# Patient Record
Sex: MALE | Race: WHITE | NOT HISPANIC OR LATINO | Employment: OTHER | URBAN - METROPOLITAN AREA
[De-identification: names, ages, dates, MRNs, and addresses within clinical notes are randomized per-mention and may not be internally consistent; named-entity substitution may affect disease eponyms.]

---

## 2017-07-13 ENCOUNTER — TELEPHONE (OUTPATIENT)
Dept: PAIN MEDICINE | Facility: CLINIC | Age: 57
End: 2017-07-13

## 2017-07-13 NOTE — TELEPHONE ENCOUNTER
----- Message from Aliza Mooney sent at 7/13/2017  8:49 AM CDT -----  Contact: pt   Has sciatic pain, wants to be seen this week   Has appt on wait list  Call back

## 2019-09-26 ENCOUNTER — OFFICE VISIT (OUTPATIENT)
Dept: PSYCHIATRY | Facility: CLINIC | Age: 59
End: 2019-09-26
Payer: MEDICARE

## 2019-09-26 VITALS
HEIGHT: 72 IN | HEART RATE: 65 BPM | BODY MASS INDEX: 26.67 KG/M2 | DIASTOLIC BLOOD PRESSURE: 62 MMHG | WEIGHT: 196.88 LBS | SYSTOLIC BLOOD PRESSURE: 100 MMHG

## 2019-09-26 DIAGNOSIS — F41.1 GENERALIZED ANXIETY DISORDER: ICD-10-CM

## 2019-09-26 PROCEDURE — 99999 PR PBB SHADOW E&M-EST. PATIENT-LVL III: CPT | Mod: PBBFAC,,, | Performed by: PSYCHOLOGIST

## 2019-09-26 PROCEDURE — 99999 PR PBB SHADOW E&M-EST. PATIENT-LVL III: ICD-10-PCS | Mod: PBBFAC,,, | Performed by: PSYCHOLOGIST

## 2019-09-26 PROCEDURE — 90792 PR PSYCHIATRIC DIAGNOSTIC EVALUATION W/MEDICAL SERVICES: ICD-10-PCS | Mod: ,,, | Performed by: PSYCHOLOGIST

## 2019-09-26 PROCEDURE — 99213 OFFICE O/P EST LOW 20 MIN: CPT | Mod: PBBFAC,PO | Performed by: PSYCHOLOGIST

## 2019-09-26 PROCEDURE — 90792 PSYCH DIAG EVAL W/MED SRVCS: CPT | Mod: ,,, | Performed by: PSYCHOLOGIST

## 2019-09-26 RX ORDER — BRINZOLAMIDE 10 MG/ML
1 SUSPENSION/ DROPS OPHTHALMIC 3 TIMES DAILY
COMMUNITY
End: 2020-12-16

## 2019-09-26 RX ORDER — BIMATOPROST 0.1 MG/ML
1 SOLUTION/ DROPS OPHTHALMIC
Refills: 4 | COMMUNITY
Start: 2019-07-12

## 2019-09-26 RX ORDER — METOPROLOL SUCCINATE 25 MG/1
25 TABLET, EXTENDED RELEASE ORAL DAILY
COMMUNITY

## 2019-09-26 RX ORDER — INSULIN GLARGINE 300 U/ML
45 INJECTION, SOLUTION SUBCUTANEOUS NIGHTLY
Status: ON HOLD | COMMUNITY
Start: 2019-07-09 | End: 2020-11-16 | Stop reason: HOSPADM

## 2019-09-26 RX ORDER — TIMOLOL MALEATE 5 MG/ML
1 SOLUTION/ DROPS OPHTHALMIC 3 TIMES DAILY
Refills: 5 | COMMUNITY
Start: 2019-09-17

## 2019-09-26 RX ORDER — TAMSULOSIN HYDROCHLORIDE 0.4 MG/1
0.4 CAPSULE ORAL DAILY
COMMUNITY

## 2019-09-26 RX ORDER — CHOLECALCIFEROL (VITD3)/VIT K2 25-90 MCG
1 TABLET,DISINTEGRATING ORAL DAILY
COMMUNITY

## 2019-09-26 RX ORDER — VILAZODONE HYDROCHLORIDE 20 MG/1
TABLET ORAL
COMMUNITY
Start: 2019-09-03 | End: 2019-10-01

## 2019-09-26 RX ORDER — PREDNISOLONE ACETATE 10 MG/ML
1 SUSPENSION/ DROPS OPHTHALMIC 2 TIMES DAILY
Refills: 1 | COMMUNITY
Start: 2019-08-23

## 2019-09-26 RX ORDER — NITROGLYCERIN 0.4 MG/1
0.4 TABLET SUBLINGUAL EVERY 5 MIN PRN
Refills: 5 | COMMUNITY
Start: 2019-07-10

## 2019-09-26 RX ORDER — BRIMONIDINE TARTRATE AND TIMOLOL MALEATE 2; 5 MG/ML; MG/ML
1 SOLUTION OPHTHALMIC
COMMUNITY

## 2019-09-26 RX ORDER — EPINEPHRINE 0.22MG
100 AEROSOL WITH ADAPTER (ML) INHALATION NIGHTLY
COMMUNITY

## 2019-09-26 RX ORDER — TESTOSTERONE CYPIONATE 200 MG/ML
200 INJECTION, SOLUTION INTRAMUSCULAR
Refills: 0 | COMMUNITY
Start: 2019-09-10 | End: 2023-02-20

## 2019-09-26 RX ORDER — CLONAZEPAM 1 MG/1
0.5 TABLET ORAL NIGHTLY
COMMUNITY
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
## 2019-09-26 NOTE — LETTER
October 1, 2019        Ernie Brannon MD  [address]  [address]  [place] [address]             [facility] - Psychiatry  [address]  [facility] [address]  Phone: [phone]   Patient: Albert David   MR Number: [id number]   YOB: 1960   Date of Visit: 9/26/2019       Dear Dr. Brannon:    Thank you for referring Albert David to me for evaluation. Below are the relevant portions of my assessment and plan of care.    Genetic testing ordered and based on this, low dose Effexor will be started. Will adjust anti-anxiety treatment as needed. Klonopin PRN continued.      If you have questions, please do not hesitate to call me. I look forward to following Albert along with you.    Sincerely,      Jaret Wise, PhD, MP           CC  No Recipients

## 2019-10-01 ENCOUNTER — TELEPHONE (OUTPATIENT)
Dept: PSYCHIATRY | Facility: CLINIC | Age: 59
End: 2019-10-01

## 2019-10-01 RX ORDER — VENLAFAXINE 37.5 MG/1
37.5 TABLET ORAL DAILY
Qty: 30 TABLET | Refills: 0 | Status: SHIPPED | OUTPATIENT
Start: 2019-10-01 | End: 2019-11-01

## 2019-10-01 NOTE — TELEPHONE ENCOUNTER
Per Dr. Wise received and reviewed patient's  genetic testing,  please let pt know that he called in Effexor 37.5mg Take 1 pill once a day, and Follow up in 4 weeks.     I spoke with pt, advise of Dr. Wise's recommendations and instructions, patient  read back and verbalized  understanding. Patient aware to call us back if he has any other questions or concerns. Also 4 week F/U appt was made.

## 2019-11-01 ENCOUNTER — OFFICE VISIT (OUTPATIENT)
Dept: PSYCHIATRY | Facility: CLINIC | Age: 59
End: 2019-11-01
Payer: MEDICARE

## 2019-11-01 VITALS
HEART RATE: 72 BPM | HEIGHT: 72 IN | WEIGHT: 194 LBS | SYSTOLIC BLOOD PRESSURE: 116 MMHG | BODY MASS INDEX: 26.28 KG/M2 | DIASTOLIC BLOOD PRESSURE: 68 MMHG

## 2019-11-01 DIAGNOSIS — F41.1 GENERALIZED ANXIETY DISORDER: Primary | ICD-10-CM

## 2019-11-01 PROCEDURE — 99213 OFFICE O/P EST LOW 20 MIN: CPT | Mod: S$PBB,,, | Performed by: PSYCHOLOGIST

## 2019-11-01 PROCEDURE — 90833 PSYTX W PT W E/M 30 MIN: CPT | Mod: ,,, | Performed by: PSYCHOLOGIST

## 2019-11-01 PROCEDURE — 99999 PR PBB SHADOW E&M-EST. PATIENT-LVL III: CPT | Mod: PBBFAC,,, | Performed by: PSYCHOLOGIST

## 2019-11-01 PROCEDURE — 99213 OFFICE O/P EST LOW 20 MIN: CPT | Mod: PBBFAC,PO | Performed by: PSYCHOLOGIST

## 2019-11-01 PROCEDURE — 99999 PR PBB SHADOW E&M-EST. PATIENT-LVL III: ICD-10-PCS | Mod: PBBFAC,,, | Performed by: PSYCHOLOGIST

## 2019-11-01 PROCEDURE — 99213 PR OFFICE/OUTPT VISIT, EST, LEVL III, 20-29 MIN: ICD-10-PCS | Mod: S$PBB,,, | Performed by: PSYCHOLOGIST

## 2019-11-01 PROCEDURE — 90833 PR PSYCHOTHERAPY W/PATIENT W/E&M, 30 MIN (ADD ON): ICD-10-PCS | Mod: ,,, | Performed by: PSYCHOLOGIST

## 2019-11-01 RX ORDER — BUSPIRONE HYDROCHLORIDE 10 MG/1
10 TABLET ORAL 3 TIMES DAILY
Qty: 90 TABLET | Refills: 1 | Status: SHIPPED | OUTPATIENT
Start: 2019-11-01 | End: 2019-12-04

## 2019-11-01 RX ORDER — SILDENAFIL 100 MG/1
100 TABLET, FILM COATED ORAL
Refills: 6 | COMMUNITY
Start: 2019-10-16

## 2019-11-01 NOTE — PROGRESS NOTES
Outpatient Psychiatry Follow-Up Visit    Clinical Status of Patient: Outpatient (Ambulatory)  11/01/2019     Chief Complaint: 59 year old male presenting today for a follow-up.       Interval History and Content of Current Session:  Interim Events/Subjective Report/Content of Current Session:  follow-up appointment.    Pt is a 59 year old male with past psychiatric hx of generalized anxiety who presents for follow-up treatment. Pt reported discontinue Effexor XR 37.5mg Q D a few days ago due to no efficacy and intolerable sexual dysfunction. We reviewed his genetic testing and prior medication hx. Buspar will be tried and if unsuccessful, gabapentin will be tried. Pt indicated that he was taking Klonopin 0.5mg BID lately and he was discouraged from daily use and educated about tolerance/addictive properties and he agreed.     Past Psychiatric hx: Vybriid, Effexor, Prozac, Elavil, Zoloft, Celexa, Lexapro, Trintellix, Cymbalta, Concerta, Strattera, Wellbutrin    Past Medical hx: History reviewed. No pertinent past medical history.     Interim hx:  Medication changes last visit:   Started Effexor XR 37.5mg Q D  Anxiety: moderate - unchanged  Depression: pt denied     Denies suicidal/homicidal ideations.  Denies hopelessness/worthlessness.    Denies auditory/visual hallucinations      Alcohol: minimal use  Drug: pt denied  Caffeine: minimal use  Tobacco: pt denied      Review of Systems   · PSYCHIATRIC: Pertinent items are noted in the narrative.        CONSTITUTIONAL: weight stable     Past Medical, Family and Social History: The patient's past medical, family and social history have been reviewed and updated as appropriate within the electronic medical record. See encounter notes.     Current Psychiatric Medication:  Klonopin 1mg Q HS PRN     Compliance: yes      Side effects: none     Risk Parameters:  Patient reports no suicidal ideation  Patient reports no homicidal ideation  Patient reports no self-injurious  behavior  Patient reports no violent behavior     Exam (detailed: at least 9 elements; comprehensive: all 15 elements)   Constitutional  Vitals:  Most recent vital signs, dated less than 90 days prior to this appointment, were reviewed. Pulse:  [72]   BP: (116)/(68)       General:  unremarkable, age appropriate, casual attire, good eye contact, good rapport       Musculoskeletal  Muscle Strength/Tone:  no flaccidity, no tremor    Gait & Station:  normal      Psychiatric                       Speech:  normal tone, normal rate, rhythm, and volume   Mood & Affect:   Depressed, anxious         Thought Process:   Goal directed; Linear    Associations:   intact   Thought Content:   No SI/HI, delusions, or paranoia, no AV/VH   Insight & Judgement:   Good, adequate to circumstances   Orientation:   grossly intact; alert and oriented x 4    Memory:  intact for content of interview    Language:  grossly intact, can repeat    Attention Span  : Grossly intact for content of interview   Fund of Knowledge:   intact and appropriate to age and level of education        Assessment and Diagnosis   Status/Progress: unchanged     Impression: Pt appears to struggle with generalized anxiety that causes high subjective distress but conventional treatments cause lethargy and low motivation. Pt is requesting to try another medication trial.    Diagnosis: Generalized Anxiety Disorder    Intervention/Counseling/Treatment Plan   · Medication Management:      1. Discontinue Effexor and start Buspar 10mg TID. Pt will reach out if side effects are intolerable and Gabapentin will be tried.     2. Use Klonopin 0.5-1mg Q D PRN sparingly and not daily     3. Call to report any worsening of symptoms or problems with the medication. Pt instructed to go to ER with thoughts of harming self, others    Psychotherapy:   · Target symptoms: anxiety  · Why chosen therapy is appropriate versus another modality: CBT used; relevant to diagnosis, patient responds  to this modality  · Outcome monitoring methods: self-report, observation  · Therapeutic intervention type: Cognitive Behavioral Therapy  · Topics discussed/themes: building skills sets for symptom management, symptom recognition, nutrition, exercise  · The patient's response to the intervention is good  · Patient's response to treatment is: good.   · The patient's progress toward treatment goals: unchanged  · Duration of intervention: 20 minutes     Return to clinic: one month    -Spent 20min face to face with the pt; >50% time spent in counseling   -Cognitive-Behavioral/Supportive therapy and psychoeducation provided  -R/B/SE's of medications discussed with the pt who expresses understanding and chooses to take medications as prescribed.   -Pt instructed to call clinic, 911 or go to nearest emergency room if sxs worsen or pt is in   crisis. The pt expresses understanding.    Jaret Wise, PhD, MP      Antidepressant/Antianxiety Medication Initiation:  Patient informed of risks, benefits, and potential side effects of medication and accepts informed consent.  Common side effects include nausea, fatigue, headache, insomnia., Specifically discussed the possibility of new or worsening suicidal thoughts/depression.  Patient instructed to stop the medication immediately and seek urgent treatment if this occurs., Patient instructed not to abruptly discontinue medication without physician guidance except in cases of sudden onset or worsening of SI.

## 2019-12-04 ENCOUNTER — OFFICE VISIT (OUTPATIENT)
Dept: PSYCHIATRY | Facility: CLINIC | Age: 59
End: 2019-12-04
Payer: MEDICARE

## 2019-12-04 VITALS
SYSTOLIC BLOOD PRESSURE: 96 MMHG | HEIGHT: 72 IN | WEIGHT: 195.19 LBS | DIASTOLIC BLOOD PRESSURE: 60 MMHG | BODY MASS INDEX: 26.44 KG/M2 | HEART RATE: 62 BPM

## 2019-12-04 DIAGNOSIS — F41.1 GENERALIZED ANXIETY DISORDER: Primary | ICD-10-CM

## 2019-12-04 PROCEDURE — 90833 PR PSYCHOTHERAPY W/PATIENT W/E&M, 30 MIN (ADD ON): ICD-10-PCS | Mod: S$PBB,,, | Performed by: PSYCHOLOGIST

## 2019-12-04 PROCEDURE — 90833 PSYTX W PT W E/M 30 MIN: CPT | Mod: S$PBB,,, | Performed by: PSYCHOLOGIST

## 2019-12-04 PROCEDURE — 99999 PR PBB SHADOW E&M-EST. PATIENT-LVL III: CPT | Mod: PBBFAC,,, | Performed by: PSYCHOLOGIST

## 2019-12-04 PROCEDURE — 99999 PR PBB SHADOW E&M-EST. PATIENT-LVL III: ICD-10-PCS | Mod: PBBFAC,,, | Performed by: PSYCHOLOGIST

## 2019-12-04 PROCEDURE — 99213 PR OFFICE/OUTPT VISIT, EST, LEVL III, 20-29 MIN: ICD-10-PCS | Mod: S$PBB,,, | Performed by: PSYCHOLOGIST

## 2019-12-04 PROCEDURE — 99213 OFFICE O/P EST LOW 20 MIN: CPT | Mod: PBBFAC,PO | Performed by: PSYCHOLOGIST

## 2019-12-04 PROCEDURE — 99213 OFFICE O/P EST LOW 20 MIN: CPT | Mod: S$PBB,,, | Performed by: PSYCHOLOGIST

## 2019-12-04 RX ORDER — BUSPIRONE HYDROCHLORIDE 10 MG/1
20 TABLET ORAL 3 TIMES DAILY
Qty: 180 TABLET | Refills: 2 | Status: SHIPPED | OUTPATIENT
Start: 2019-12-04 | End: 2020-02-03

## 2019-12-04 RX ORDER — LISINOPRIL 20 MG/1
20 TABLET ORAL
COMMUNITY
Start: 2019-11-17

## 2019-12-04 NOTE — PROGRESS NOTES
Outpatient Psychiatry Follow-Up Visit    Clinical Status of Patient: Outpatient (Ambulatory)  12/04/2019     Chief Complaint: 59 year old male presenting today for a follow-up.       Interval History and Content of Current Session:  Interim Events/Subjective Report/Content of Current Session:  follow-up appointment.    Pt is a 59 year old male with past psychiatric hx of generalized anxiety who presents for follow-up treatment. Pt reported partial improvement on Buspar 10mg TID with good compliance and no side effects. Pt's wife noted that she was somewhat less irritable. Pt has continued to take Klonopin 0.5mg Q HS for sleep. Pt denied any other significant changes.     Past Psychiatric hx: Vybriid, Effexor, Prozac, Elavil, Zoloft, Celexa, Lexapro, Trintellix, Cymbalta, Concerta, Strattera, Wellbutrin    Past Medical hx: History reviewed. No pertinent past medical history.     Interim hx:  Medication changes last visit:   Started Buspar 10mg TID  Anxiety: mild - improved  Depression: pt denied     Denies suicidal/homicidal ideations.  Denies hopelessness/worthlessness.    Denies auditory/visual hallucinations      Alcohol: minimal use  Drug: pt denied  Caffeine: minimal use  Tobacco: pt denied      Review of Systems   · PSYCHIATRIC: Pertinent items are noted in the narrative.        CONSTITUTIONAL: weight stable     Past Medical, Family and Social History: The patient's past medical, family and social history have been reviewed and updated as appropriate within the electronic medical record. See encounter notes.     Current Psychiatric Medication:  Buspar 10mg TID and Klonopin 0.5mg Q HS PRN     Compliance: yes      Side effects: none     Risk Parameters:  Patient reports no suicidal ideation  Patient reports no homicidal ideation  Patient reports no self-injurious behavior  Patient reports no violent behavior     Exam (detailed: at least 9 elements; comprehensive: all 15 elements)   Constitutional  Vitals:  Most  recent vital signs, dated less than 90 days prior to this appointment, were reviewed. Pulse:  [62]   BP: (96)/(60)       General:  unremarkable, age appropriate, casual attire, good eye contact, good rapport       Musculoskeletal  Muscle Strength/Tone:  no flaccidity, no tremor    Gait & Station:  normal      Psychiatric                       Speech:  normal tone, normal rate, rhythm, and volume   Mood & Affect:   Depressed, anxious         Thought Process:   Goal directed; Linear    Associations:   intact   Thought Content:   No SI/HI, delusions, or paranoia, no AV/VH   Insight & Judgement:   Good, adequate to circumstances   Orientation:   grossly intact; alert and oriented x 4    Memory:  intact for content of interview    Language:  grossly intact, can repeat    Attention Span  : Grossly intact for content of interview   Fund of Knowledge:   intact and appropriate to age and level of education        Assessment and Diagnosis   Status/Progress: unchanged     Impression: Pt appears to struggle with generalized anxiety that causes high subjective distress but conventional treatments cause lethargy and low motivation. Pt is requesting to try another medication trial.    Diagnosis: Generalized Anxiety Disorder    Intervention/Counseling/Treatment Plan   · Medication Management:      1. Increase Buspar to 20mg TID     2. Use Klonopin 0.5-1mg Q D PRN sparingly     3. Call to report any worsening of symptoms or problems with the medication. Pt instructed to go to ER with thoughts of harming self, others    Psychotherapy:   · Target symptoms: anxiety  · Why chosen therapy is appropriate versus another modality: CBT used; relevant to diagnosis, patient responds to this modality  · Outcome monitoring methods: self-report, observation  · Therapeutic intervention type: Cognitive Behavioral Therapy  · Topics discussed/themes: building skills sets for symptom management, symptom recognition, nutrition, exercise  · The  patient's response to the intervention is good  · Patient's response to treatment is: good.   · The patient's progress toward treatment goals: unchanged  · Duration of intervention: 20 minutes     Return to clinic: 2 months    -Spent 20min face to face with the pt; >50% time spent in counseling   -Cognitive-Behavioral/Supportive therapy and psychoeducation provided  -R/B/SE's of medications discussed with the pt who expresses understanding and chooses to take medications as prescribed.   -Pt instructed to call clinic, 911 or go to nearest emergency room if sxs worsen or pt is in   crisis. The pt expresses understanding.    Jaret Wise, PhD, MP      Antidepressant/Antianxiety Medication Initiation:  Patient informed of risks, benefits, and potential side effects of medication and accepts informed consent.  Common side effects include nausea, fatigue, headache, insomnia., Specifically discussed the possibility of new or worsening suicidal thoughts/depression.  Patient instructed to stop the medication immediately and seek urgent treatment if this occurs., Patient instructed not to abruptly discontinue medication without physician guidance except in cases of sudden onset or worsening of SI.

## 2020-02-03 ENCOUNTER — OFFICE VISIT (OUTPATIENT)
Dept: PSYCHIATRY | Facility: CLINIC | Age: 60
End: 2020-02-03
Payer: MEDICARE

## 2020-02-03 VITALS
WEIGHT: 194 LBS | HEIGHT: 72 IN | BODY MASS INDEX: 26.28 KG/M2 | DIASTOLIC BLOOD PRESSURE: 72 MMHG | SYSTOLIC BLOOD PRESSURE: 128 MMHG

## 2020-02-03 DIAGNOSIS — F41.1 GENERALIZED ANXIETY DISORDER: Primary | ICD-10-CM

## 2020-02-03 PROCEDURE — 99212 OFFICE O/P EST SF 10 MIN: CPT | Mod: PBBFAC,PO | Performed by: PSYCHOLOGIST

## 2020-02-03 PROCEDURE — 90833 PSYTX W PT W E/M 30 MIN: CPT | Mod: S$PBB,,, | Performed by: PSYCHOLOGIST

## 2020-02-03 PROCEDURE — 90833 PR PSYCHOTHERAPY W/PATIENT W/E&M, 30 MIN (ADD ON): ICD-10-PCS | Mod: S$PBB,,, | Performed by: PSYCHOLOGIST

## 2020-02-03 PROCEDURE — 99999 PR PBB SHADOW E&M-EST. PATIENT-LVL II: ICD-10-PCS | Mod: PBBFAC,,, | Performed by: PSYCHOLOGIST

## 2020-02-03 PROCEDURE — 99999 PR PBB SHADOW E&M-EST. PATIENT-LVL II: CPT | Mod: PBBFAC,,, | Performed by: PSYCHOLOGIST

## 2020-02-03 PROCEDURE — 99213 OFFICE O/P EST LOW 20 MIN: CPT | Mod: S$PBB,,, | Performed by: PSYCHOLOGIST

## 2020-02-03 PROCEDURE — 99213 PR OFFICE/OUTPT VISIT, EST, LEVL III, 20-29 MIN: ICD-10-PCS | Mod: S$PBB,,, | Performed by: PSYCHOLOGIST

## 2020-02-03 RX ORDER — BLOOD SUGAR DIAGNOSTIC
STRIP MISCELLANEOUS
COMMUNITY
Start: 2019-12-14 | End: 2023-02-20

## 2020-02-03 RX ORDER — OMEPRAZOLE 40 MG/1
CAPSULE, DELAYED RELEASE ORAL
COMMUNITY
Start: 2019-12-16 | End: 2020-11-03

## 2020-02-03 RX ORDER — BUSPIRONE HYDROCHLORIDE 10 MG/1
20 TABLET ORAL 3 TIMES DAILY
Qty: 540 TABLET | Refills: 1 | Status: SHIPPED | OUTPATIENT
Start: 2020-02-03 | End: 2020-08-03 | Stop reason: SDUPTHER

## 2020-02-03 NOTE — LETTER
February 3, 2020        Ernie Brannon MD  [address]  [address]  [place] [address]             [facility] - Psychiatry  [address]  [facility] [address]  Phone: [phone]   Patient: Albert David   MR Number: [id number]   YOB: 1960   Date of Visit: 2/3/2020       Dear Dr. Brannon:    Thank you for referring Albert David to me for evaluation. Below are the relevant portions of my assessment and plan of care.    Pt appears stable on Buspar 20mg TID and Klonopin 0.5mg Q HS. Will continue to monitor and follow.     If you have questions, please do not hesitate to call me. I look forward to following Albert along with you.    Sincerely,      Jaret Wise, PhD, MP           CC  No Recipients

## 2020-02-03 NOTE — PROGRESS NOTES
Outpatient Psychiatry Follow-Up Visit    Clinical Status of Patient: Outpatient (Ambulatory)  02/03/2020     Chief Complaint: 59 year old male presenting today for a follow-up.       Interval History and Content of Current Session:  Interim Events/Subjective Report/Content of Current Session:  follow-up appointment.    Pt is a 59 year old male with past psychiatric hx of generalized anxiety who presents for follow-up treatment. Pt reported slightly improved but still only partial efficacy with Buspar 20mg TID with his anxiety. Pt said that he is still anxious and still picks his arms and eats in the evening when anxious but overall noted improvement. Pt has continued to take Klonopin 0.5mg Q HS for sleep. Pt denied any other significant changes.     Past Psychiatric hx: Vybriid, Effexor, Prozac, Elavil, Zoloft, Celexa, Lexapro, Trintellix, Cymbalta, Concerta, Strattera, Wellbutrin    Past Medical hx: History reviewed. No pertinent past medical history.     Interim hx:  Medication changes last visit:   Increased Buspar dosage to 20mg TID  Anxiety: mild - improved  Depression: pt denied     Denies suicidal/homicidal ideations.  Denies hopelessness/worthlessness.    Denies auditory/visual hallucinations      Alcohol: minimal use  Drug: pt denied  Caffeine: minimal use  Tobacco: pt denied      Review of Systems   · PSYCHIATRIC: Pertinent items are noted in the narrative.        CONSTITUTIONAL: weight stable     Past Medical, Family and Social History: The patient's past medical, family and social history have been reviewed and updated as appropriate within the electronic medical record. See encounter notes.     Current Psychiatric Medication:  Buspar 20mg TID and Klonopin 0.5mg Q HS PRN     Compliance: yes      Side effects: none     Risk Parameters:  Patient reports no suicidal ideation  Patient reports no homicidal ideation  Patient reports no self-injurious behavior  Patient reports no violent behavior     Exam  (detailed: at least 9 elements; comprehensive: all 15 elements)   Constitutional  Vitals:  Most recent vital signs, dated less than 90 days prior to this appointment, were reviewed. BP: (128)/(72)       General:  unremarkable, age appropriate, casual attire, good eye contact, good rapport       Musculoskeletal  Muscle Strength/Tone:  no flaccidity, no tremor    Gait & Station:  normal      Psychiatric                       Speech:  normal tone, normal rate, rhythm, and volume   Mood & Affect:   Depressed, anxious         Thought Process:   Goal directed; Linear    Associations:   intact   Thought Content:   No SI/HI, delusions, or paranoia, no AV/VH   Insight & Judgement:   Good, adequate to circumstances   Orientation:   grossly intact; alert and oriented x 4    Memory:  intact for content of interview    Language:  grossly intact, can repeat    Attention Span  : Grossly intact for content of interview   Fund of Knowledge:   intact and appropriate to age and level of education        Assessment and Diagnosis   Status/Progress: unchanged     Impression: Pt appears to struggle with generalized anxiety that causes high subjective distress but conventional treatments cause lethargy and low motivation. Pt is requesting to try another medication trial.    Diagnosis: Generalized Anxiety Disorder    Intervention/Counseling/Treatment Plan   · Medication Management:      1. Continue Buspar to 20mg TID     2. Use Klonopin 0.5-1mg Q D PRN sparingly     3. Call to report any worsening of symptoms or problems with the medication. Pt instructed to go to ER with thoughts of harming self, others    Psychotherapy:   · Target symptoms: anxiety  · Why chosen therapy is appropriate versus another modality: CBT used; relevant to diagnosis, patient responds to this modality  · Outcome monitoring methods: self-report, observation  · Therapeutic intervention type: Cognitive Behavioral Therapy  · Topics discussed/themes: building skills  sets for symptom management, symptom recognition, nutrition, exercise  · The patient's response to the intervention is good  · Patient's response to treatment is: good.   · The patient's progress toward treatment goals: unchanged  · Duration of intervention: 20 minutes     Return to clinic: 6 months or earlier PRN    -Spent 20min face to face with the pt; >50% time spent in counseling   -Cognitive-Behavioral/Supportive therapy and psychoeducation provided  -R/B/SE's of medications discussed with the pt who expresses understanding and chooses to take medications as prescribed.   -Pt instructed to call clinic, 911 or go to nearest emergency room if sxs worsen or pt is in   crisis. The pt expresses understanding.    Jaret Wise, PhD, MP

## 2020-06-16 ENCOUNTER — TELEPHONE (OUTPATIENT)
Dept: DERMATOLOGY | Facility: CLINIC | Age: 60
End: 2020-06-16

## 2020-06-16 ENCOUNTER — OFFICE VISIT (OUTPATIENT)
Dept: DERMATOLOGY | Facility: CLINIC | Age: 60
End: 2020-06-16
Payer: MEDICARE

## 2020-06-16 VITALS — HEIGHT: 72 IN | BODY MASS INDEX: 26.28 KG/M2 | RESPIRATION RATE: 18 BRPM | WEIGHT: 194 LBS

## 2020-06-16 DIAGNOSIS — Z12.83 SCREENING EXAM FOR SKIN CANCER: Primary | ICD-10-CM

## 2020-06-16 DIAGNOSIS — L28.1 PRURIGO NODULARIS: ICD-10-CM

## 2020-06-16 DIAGNOSIS — L82.1 SEBORRHEIC KERATOSES: ICD-10-CM

## 2020-06-16 DIAGNOSIS — Z87.2 HISTORY OF ACTINIC KERATOSES: ICD-10-CM

## 2020-06-16 DIAGNOSIS — I99.9 VASCULAR LESION: ICD-10-CM

## 2020-06-16 PROCEDURE — 99203 OFFICE O/P NEW LOW 30 MIN: CPT | Mod: S$PBB,,, | Performed by: DERMATOLOGY

## 2020-06-16 PROCEDURE — 99999 PR PBB SHADOW E&M-EST. PATIENT-LVL IV: ICD-10-PCS | Mod: PBBFAC,,, | Performed by: DERMATOLOGY

## 2020-06-16 PROCEDURE — 99214 OFFICE O/P EST MOD 30 MIN: CPT | Mod: PBBFAC,PO | Performed by: DERMATOLOGY

## 2020-06-16 PROCEDURE — 99999 PR PBB SHADOW E&M-EST. PATIENT-LVL IV: CPT | Mod: PBBFAC,,, | Performed by: DERMATOLOGY

## 2020-06-16 PROCEDURE — 99203 PR OFFICE/OUTPT VISIT, NEW, LEVL III, 30-44 MIN: ICD-10-PCS | Mod: S$PBB,,, | Performed by: DERMATOLOGY

## 2020-06-16 RX ORDER — FLUOCINONIDE 0.5 MG/G
OINTMENT TOPICAL
Qty: 60 G | Refills: 1 | Status: SHIPPED | OUTPATIENT
Start: 2020-06-16

## 2020-06-16 RX ORDER — TRIAMCINOLONE ACETONIDE 1 MG/G
CREAM TOPICAL 2 TIMES DAILY
Qty: 453.6 G | Refills: 1 | Status: CANCELLED | OUTPATIENT
Start: 2020-06-16

## 2020-06-16 NOTE — TELEPHONE ENCOUNTER
----- Message from Samantha De Oliveira sent at 6/16/2020  1:09 PM CDT -----  Regarding: f/u 6 month appt  [phone] / pt was seen today,, asking for nurse to schedule his  body check,, thought he needs one every 6 months.. asking if you can call with that appointment. He is outside a lot... ty

## 2020-06-16 NOTE — PROGRESS NOTES
Subjective:       Patient ID:  Albert David is a 60 y.o. male who presents for   Chief Complaint   Patient presents with    Skin Check     Patient present for initial visit, skin check.     The patient denies any lesions at present that are changing in color, increasing in size, painful, itching, or bleeding.     no Phx of NMSC.  no Fhx of melanoma.    History reviewed. No pertinent past medical history.  history AKs  Red area nose, years, tx light therapy. No resolution.   Picks at lesions on arms, years, admits to chronically picking. History anxiety on buspar and clonazopam     Review of Systems   Skin: Positive for activity-related sunscreen use. Negative for daily sunscreen use and recent sunburn.   Hematologic/Lymphatic: Does not bruise/bleed easily.        Objective:    Physical Exam   Constitutional: He appears well-developed and well-nourished. No distress.   HENT:   Mouth/Throat: Lips normal.    Eyes: Lids are normal.  No conjunctival no injection.   Cardiovascular: There is no local extremity swelling and no dependent edema.     Neurological: He is alert and oriented to person, place, and time. He is not disoriented.   Psychiatric: He has a normal mood and affect.   Skin:   Areas Examined (abnormalities noted in diagram):   Scalp / Hair Palpated and Inspected  Head / Face Inspection Performed  Neck Inspection Performed  Chest / Axilla Inspection Performed  Abdomen Inspection Performed  Back Inspection Performed  RUE Inspected  LUE Inspection Performed  RLE Inspected  LLE Inspection Performed  Nails and Digits Inspection Performed                   Diagram Legend     Erythematous scaling macule/papule c/w actinic keratosis       Vascular papule c/w angioma      Pigmented verrucoid papule/plaque c/w seborrheic keratosis      Yellow umbilicated papule c/w sebaceous hyperplasia      Irregularly shaped tan macule c/w lentigo     1-2 mm smooth white papules consistent with Milia      Movable  subcutaneous cyst with punctum c/w epidermal inclusion cyst      Subcutaneous movable cyst c/w pilar cyst      Firm pink to brown papule c/w dermatofibroma      Pedunculated fleshy papule(s) c/w skin tag(s)      Evenly pigmented macule c/w junctional nevus     Mildly variegated pigmented, slightly irregular-bordered macule c/w mildly atypical nevus      Flesh colored to evenly pigmented papule c/w intradermal nevus       Pink pearly papule/plaque c/w basal cell carcinoma      Erythematous hyperkeratotic cursted plaque c/w SCC      Surgical scar with no sign of skin cancer recurrence      Open and closed comedones      Inflammatory papules and pustules      Verrucoid papule consistent consistent with wart     Erythematous eczematous patches and plaques     Dystrophic onycholytic nail with subungual debris c/w onychomycosis     Umbilicated papule    Erythematous-base heme-crusted tan verrucoid plaque consistent with inflamed seborrheic keratosis     Erythematous Silvery Scaling Plaque c/w Psoriasis     See annotation      Assessment / Plan:        Screening exam for skin cancer  Upper body skin examination performed today including at least 6 points as noted in physical examination. No lesions suspicious for malignancy noted.        Prurigo nodularis  Discussed with patient the importance of not manipulating skin lesions. Trauma often exacerbates condition. Trimming nails is recommended to avoid puncturing skin.     Consider ILK in future   -     fluocinonide (LIDEX) 0.05 % ointment; aaa arms bid x 1-2 weeks, avoid chronic use  Dispense: 60 g; Refill: 1    Vascular lesion, nose  Failed what sounds like laser therapy in past  Discussed no topical options  Consider repeat laser in future     History of actinic keratoses  NER  Area(s) of previous AKs evaluated with no signs of recurrence.    Upper body skin examination performed today including at least 6 points as noted in physical examination. No lesions suspicious for  malignancy noted.      Seborrheic keratoses, leg  These are benign inherited growths without a malignant potential. Reassurance given to patient. No treatment is necessary.              Follow up in about 6 months (around 12/16/2020).

## 2020-06-23 DIAGNOSIS — M25.512 LEFT SHOULDER PAIN, UNSPECIFIED CHRONICITY: Primary | ICD-10-CM

## 2020-06-24 ENCOUNTER — OFFICE VISIT (OUTPATIENT)
Dept: ORTHOPEDICS | Facility: CLINIC | Age: 60
End: 2020-06-24
Payer: MEDICARE

## 2020-06-24 ENCOUNTER — HOSPITAL ENCOUNTER (OUTPATIENT)
Dept: RADIOLOGY | Facility: HOSPITAL | Age: 60
Discharge: HOME | End: 2020-06-24
Attending: ORTHOPAEDIC SURGERY
Payer: MEDICARE

## 2020-06-24 VITALS — WEIGHT: 194 LBS | RESPIRATION RATE: 16 BRPM | BODY MASS INDEX: 26.28 KG/M2 | HEIGHT: 72 IN

## 2020-06-24 DIAGNOSIS — M25.512 LEFT SHOULDER PAIN, UNSPECIFIED CHRONICITY: ICD-10-CM

## 2020-06-24 DIAGNOSIS — M75.100 ROTATOR CUFF SYNDROME, UNSPECIFIED LATERALITY: Primary | ICD-10-CM

## 2020-06-24 PROCEDURE — 73030 X-RAY EXAM OF SHOULDER: CPT | Mod: TC,PO,LT

## 2020-06-24 PROCEDURE — 73030 XR SHOULDER TRAUMA 3 VIEW LEFT: ICD-10-PCS | Mod: 26,LT,, | Performed by: RADIOLOGY

## 2020-06-24 PROCEDURE — 99203 OFFICE O/P NEW LOW 30 MIN: CPT | Mod: 25,S$PBB,, | Performed by: ORTHOPAEDIC SURGERY

## 2020-06-24 PROCEDURE — 99203 PR OFFICE/OUTPT VISIT, NEW, LEVL III, 30-44 MIN: ICD-10-PCS | Mod: 25,S$PBB,, | Performed by: ORTHOPAEDIC SURGERY

## 2020-06-24 PROCEDURE — 99999 PR PBB SHADOW E&M-EST. PATIENT-LVL III: CPT | Mod: PBBFAC,,, | Performed by: ORTHOPAEDIC SURGERY

## 2020-06-24 PROCEDURE — 73030 X-RAY EXAM OF SHOULDER: CPT | Mod: 26,LT,, | Performed by: RADIOLOGY

## 2020-06-24 PROCEDURE — 20610 DRAIN/INJ JOINT/BURSA W/O US: CPT | Mod: PBBFAC,PN | Performed by: ORTHOPAEDIC SURGERY

## 2020-06-24 PROCEDURE — 99999 PR PBB SHADOW E&M-EST. PATIENT-LVL III: ICD-10-PCS | Mod: PBBFAC,,, | Performed by: ORTHOPAEDIC SURGERY

## 2020-06-24 PROCEDURE — 99213 OFFICE O/P EST LOW 20 MIN: CPT | Mod: PBBFAC,25,PN | Performed by: ORTHOPAEDIC SURGERY

## 2020-06-24 PROCEDURE — 20610 LARGE JOINT ASPIRATION/INJECTION: L SUBACROMIAL BURSA: ICD-10-PCS | Mod: S$PBB,LT,, | Performed by: ORTHOPAEDIC SURGERY

## 2020-06-24 RX ORDER — TRIAMCINOLONE ACETONIDE 40 MG/ML
40 INJECTION, SUSPENSION INTRA-ARTICULAR; INTRAMUSCULAR
Status: DISCONTINUED | OUTPATIENT
Start: 2020-06-24 | End: 2020-06-24 | Stop reason: HOSPADM

## 2020-06-24 RX ADMIN — TRIAMCINOLONE ACETONIDE 40 MG: 40 INJECTION, SUSPENSION INTRA-ARTICULAR; INTRAMUSCULAR at 08:06

## 2020-06-24 NOTE — PROCEDURES
Large Joint Aspiration/Injection: L subacromial bursa    Date/Time: 6/24/2020 8:45 AM  Performed by: Vipul James MD  Authorized by: Vipul James MD     Consent Done?:  Yes (Verbal)  Indications:  Pain  Site marked: the procedure site was marked    Timeout: prior to procedure the correct patient, procedure, and site was verified    Local anesthetic:  Lidocaine 1% without epinephrine and bupivacaine 0.25% without epinephrine  Anesthetic total (ml):  6      Details:  Needle Size:  20 G  Ultrasonic Guidance for needle placement?: No    Approach:  Posterior  Location:  Shoulder  Site:  L subacromial bursa  Medications:  40 mg triamcinolone acetonide 40 mg/mL  Patient tolerance:  Patient tolerated the procedure well with no immediate complications

## 2020-06-24 NOTE — LETTER
June 24, 2020      Ernie Brannon MD  [address] [facility]  [address]  [place] [address]           [facility]  [address] [place] [address]  Phone: [phone]          Patient: Albert David   MR Number: [id number]   YOB: 1960   Date of Visit: 6/24/2020       Dear Dr. Ernie Brannon:    Thank you for referring Albert David to me for evaluation. Attached you will find relevant portions of my assessment and plan of care.    If you have questions, please do not hesitate to call me. I look forward to following Albert David along with you.    Sincerely,    Vipul James MD    Enclosure  CC:  No Recipients    If you would like to receive this communication electronically, please contact [email or web address] or [phone] to request more information on [employer] ([email or web address]) Link access.    For providers and/or their staff who would like to refer a patient to [facility], please contact us through our one-stop-shop provider referral line, [facility] , at [phone].    If you feel you have received this communication in error or would no longer like to receive these types of communications, please e-mail [email or web address]

## 2020-06-24 NOTE — PROGRESS NOTES
History reviewed. No pertinent past medical history.    History reviewed. No pertinent surgical history.    Current Outpatient Medications   Medication Sig    aspirin (ECOTRIN) 81 MG EC tablet Take 81 mg by mouth once daily.    brimonidine-timolol (COMBIGAN) 0.2-0.5 % Drop 1 drop.     brinzolamide (AZOPT) 1 % ophthalmic suspension Place 1 drop into the left eye 3 (three) times daily.     busPIRone (BUSPAR) 10 MG tablet Take 2 tablets (20 mg total) by mouth 3 (three) times daily.    CIALIS 5 mg tablet Take 5 mg by mouth once daily.    clonazePAM (KLONOPIN) 1 MG tablet Take 0.5 mg by mouth every evening.     coenzyme Q10 100 mg capsule Take 100 mg by mouth every evening.     CONTOUR NEXT TEST STRIPS Strp     CRESTOR 40 mg Tab Take 40 mg by mouth once daily.     fish oil-omega-3 fatty acids 300-1,000 mg capsule Take 1,200 g by mouth 2 (two) times daily.     fluocinonide (LIDEX) 0.05 % ointment aaa arms bid x 1-2 weeks, avoid chronic use    HUMALOG 100 unit/mL injection Inject 0-10 Units into the skin 3 (three) times daily as needed for High Blood Sugar. Blood Sugar  Low Dose      No Insulin  130-150  2 units  151-180  4 units   181-200  6 units  201-240  8 units   241-300<  10 units    [id number]-algal oil (METANX, ALGAL OIL,) 3 mg-35 mg-2 mg -90.314 mg Cap Take 1 capsule by mouth 2 (two) times daily. (Patient taking differently: Take 1 capsule by mouth once daily. )    lisinopril (PRINIVIL,ZESTRIL) 20 MG tablet Take 20 mg by mouth.     lisinopril 10 MG tablet Take 15 mg by mouth once daily.     LUMIGAN 0.01 % Drop Place 1 drop into the left eye 3 (three) times a week.     metoprolol succinate (TOPROL-XL) 25 MG 24 hr tablet Take 25 mg by mouth once daily.     NEXIUM 40 mg capsule Take 40 mg by mouth once daily.    nitroGLYCERIN (NITROSTAT) 0.4 MG SL tablet Place 0.4 mg under the tongue every 5 (five) minutes as needed for Chest pain.     omeprazole (PRILOSEC) 40 MG capsule      prednisoLONE acetate (PRED FORTE) 1 % DrpS Place 1 drop into the left eye 2 (two) times daily.     sildenafil (VIAGRA) 100 MG tablet Take 100 mg by mouth as needed for Erectile Dysfunction.     sucralfate (CARAFATE) 1 gram tablet     tamsulosin (FLOMAX) 0.4 mg Cap Take 0.4 mg by mouth once daily.     testosterone cypionate (DEPOTESTOTERONE CYPIONATE) 200 mg/mL injection Inject 200 mg into the muscle every 30 days.     timolol maleate 0.5% (TIMOPTIC) 0.5 % Drop Place 1 drop into both eyes 3 (three) times daily.     TOUJEO SOLOSTAR U-300 INSULIN 300 unit/mL (1.5 mL) InPn pen Inject 45 Units into the skin every evening.     vitamin D3-vitamin K2 (D3 + K2 DOTS) 1000-90 unit-mcg TbDL Take 1 tablet by mouth once daily.      No current facility-administered medications for this visit.        Review of patient's allergies indicates:   Allergen Reactions    Ciprofloxacin     Iodinated contrast media     Penicillins     Sulfa (sulfonamide antibiotics)        History reviewed. No pertinent family history.    Social History     Socioeconomic History    Marital status: Single     Spouse name: Not on file    Number of children: Not on file    Years of education: Not on file    Highest education level: Not on file   Occupational History    Not on file   Social Needs    Financial resource strain: Not on file    Food insecurity     Worry: Not on file     Inability: Not on file    Transportation needs     Medical: Not on file     Non-medical: Not on file   Tobacco Use    Smoking status: Never Smoker   Substance and Sexual Activity    Alcohol use: Not on file    Drug use: Not on file    Sexual activity: Not on file   Lifestyle    Physical activity     Days per week: Not on file     Minutes per session: Not on file    Stress: Not on file   Relationships    Social connections     Talks on phone: Not on file     Gets together: Not on file     Attends Gnosticist service: Not on file     Active member of club or  organization: Not on file     Attends meetings of clubs or organizations: Not on file     Relationship status: Not on file   Other Topics Concern    Not on file   Social History Narrative    Not on file       Chief Complaint:   Chief Complaint   Patient presents with    Shoulder Pain     left shoulder pain       History of present illness:  This is a 60-year-old right-hand-dominant male seen for left shoulder pain.  Patient has a history of both shoulders being scoped by Dr. Richey previously.  Patient has had worsening pain over the last several months.  No injury or trauma.  Pain particularly at night.  Loss of range of motion in numerous joints throughout his body.  Pain is a 3/10 at rest and up to a 5/10 at night.  Patient initially had some tingling in his left forearm and hand.  It now goes into the shoulder in even up into his neck.  No recent treatment.      Answers for HPI/ROS submitted by the patient on 6/23/2020   Arm pain  unexpected weight change: No  appetite change : No  sleep disturbance: Yes  IMMUNOCOMPROMISED: No  nervous/ anxious: Yes  dysphoric mood: No  rash: No  visual disturbance: No  eye redness: Yes  eye pain: No  ear pain: No  tinnitus: Yes  hearing loss: Yes  sinus pressure : No  nosebleeds: No  enviro allergies: Yes  food allergies: No  cough: No  shortness of breath: No  sweating: No  frequency: No  difficulty urinating: Yes  hematuria: No  chest pain: No  palpitations: No  nausea: No  vomiting: No  diarrhea: No  blood in stool: No  constipation: No  headaches: No  dizziness: No  numbness: Yes  seizures: No  joint swelling: No  myalgia: Yes  weakness: No  back pain: No  Pain Chronicity: new  History of trauma: Yes  Onset: 1 to 4 weeks ago  Frequency: daily  Progression since onset: unchanged  injury location: at home  pain- numeric: 5/10  pain location: left shoulder  pain quality: aching  Radiating Pain: Yes  If your pain is radiating, to what part of the body?: left arm  Aggravating  factors: lying down  fever: No  inability to bear weight: No  itching: No  joint locking: No  limited range of motion: No  stiffness: No  tingling: Yes  Treatments tried: OTC pain meds  physical therapy: not tried  Improvement on treatment: mild      Physical Examination:    Vital Signs:    Vitals:    06/24/20 0829   Resp: 16       Body mass index is 26.31 kg/m².    This a well-developed, well nourished patient in no acute distress.  They are alert and oriented and cooperative to examination.  Pt. walks without an antalgic gait.      Examination of the left shoulder shows no rashes or erythema. There are no masses, ecchymosis, or atrophy. The patient has full range of motion in forward flexion, external rotation, and internal rotation to the mid T-spine. The patient has moderately positive impingement signs. - Norvell's test. - Speeds test. Nontender to palpation over a.c. joint. Normal stability anteriorly, posteriorly, and negative sulcus sign. Passive range of motion: Forward flexion of 180°, external rotation at 90° of 90°, internal rotation of 50°, and external rotation at 0° of 50°. 2+ radial pulse. Intact axillary, radial, median and ulnar sensation. 5 out of 5 resisted forward flexion, external rotation, and negative lift off test.    Examination of the right shoulder shows no rashes or erythema. There are no masses, ecchymosis, or atrophy. The patient has full range of motion in forward flexion, external rotation, and internal rotation to the mid T-spine. The patient has - impingement signs. - Norvell's test. - Speeds test. Nontender to palpation over a.c. joint. Normal stability anteriorly, posteriorly, and negative sulcus sign. Passive range of motion: Forward flexion of 180°, external rotation at 90° of 90°, internal rotation of 50°, and external rotation at 0° of 50°. 2+ radial pulse. Intact axillary, radial, median and ulnar sensation. 5 out of 5 resisted forward flexion, external rotation, and  negative lift off test.        X-rays:  X-rays of the left shoulder ordered and reviewed which show some very mild glenohumeral arthritis.  Patient has a metal piece of shrapnel within his lateral shoulder and deltoid.     Assessment::  Left rotator cuff syndrome    Plan:  I reviewed the findings with him today.  We talked about treatment options.  Patient elected for a subacromial cortisone injection and a home rotator cuff exercise program.  I gave him a Thera-Band.  We talked about possibly getting an MRI to evaluate his rotator cuff if not improving.    This note was created using Octopusapp voice recognition software that occasionally misinterpreted phrases or words.    Consult note is delivered via Epic messaging service.

## 2020-07-01 ENCOUNTER — OFFICE VISIT (OUTPATIENT)
Dept: ORTHOPEDICS | Facility: CLINIC | Age: 60
End: 2020-07-01
Payer: MEDICARE

## 2020-07-01 VITALS — HEIGHT: 72 IN | BODY MASS INDEX: 26.28 KG/M2 | WEIGHT: 194 LBS | TEMPERATURE: 98 F

## 2020-07-01 DIAGNOSIS — M54.12 LEFT CERVICAL RADICULOPATHY: Primary | ICD-10-CM

## 2020-07-01 PROCEDURE — 99213 OFFICE O/P EST LOW 20 MIN: CPT | Mod: S$PBB,,, | Performed by: ORTHOPAEDIC SURGERY

## 2020-07-01 PROCEDURE — 99213 PR OFFICE/OUTPT VISIT, EST, LEVL III, 20-29 MIN: ICD-10-PCS | Mod: S$PBB,,, | Performed by: ORTHOPAEDIC SURGERY

## 2020-07-01 PROCEDURE — 99999 PR PBB SHADOW E&M-EST. PATIENT-LVL III: ICD-10-PCS | Mod: PBBFAC,,, | Performed by: ORTHOPAEDIC SURGERY

## 2020-07-01 PROCEDURE — 99999 PR PBB SHADOW E&M-EST. PATIENT-LVL III: CPT | Mod: PBBFAC,,, | Performed by: ORTHOPAEDIC SURGERY

## 2020-07-01 PROCEDURE — 99213 OFFICE O/P EST LOW 20 MIN: CPT | Mod: PBBFAC,PN | Performed by: ORTHOPAEDIC SURGERY

## 2020-07-01 NOTE — PROGRESS NOTES
History reviewed. No pertinent past medical history.    History reviewed. No pertinent surgical history.    Current Outpatient Medications   Medication Sig    aspirin (ECOTRIN) 81 MG EC tablet Take 81 mg by mouth once daily.    brimonidine-timolol (COMBIGAN) 0.2-0.5 % Drop 1 drop.     brinzolamide (AZOPT) 1 % ophthalmic suspension Place 1 drop into the left eye 3 (three) times daily.     busPIRone (BUSPAR) 10 MG tablet Take 2 tablets (20 mg total) by mouth 3 (three) times daily.    CIALIS 5 mg tablet Take 5 mg by mouth once daily.    clonazePAM (KLONOPIN) 1 MG tablet Take 0.5 mg by mouth every evening.     coenzyme Q10 100 mg capsule Take 100 mg by mouth every evening.     CONTOUR NEXT TEST STRIPS Strp     CRESTOR 40 mg Tab Take 40 mg by mouth once daily.     fish oil-omega-3 fatty acids 300-1,000 mg capsule Take 1,200 g by mouth 2 (two) times daily.     fluocinonide (LIDEX) 0.05 % ointment aaa arms bid x 1-2 weeks, avoid chronic use    HUMALOG 100 unit/mL injection Inject 0-10 Units into the skin 3 (three) times daily as needed for High Blood Sugar. Blood Sugar  Low Dose      No Insulin  130-150  2 units  151-180  4 units   181-200  6 units  201-240  8 units   241-300<  10 units    [id number]-algal oil (METANX, ALGAL OIL,) 3 mg-35 mg-2 mg -90.314 mg Cap Take 1 capsule by mouth 2 (two) times daily. (Patient taking differently: Take 1 capsule by mouth once daily. )    lisinopril (PRINIVIL,ZESTRIL) 20 MG tablet Take 20 mg by mouth.     lisinopril 10 MG tablet Take 15 mg by mouth once daily.     LUMIGAN 0.01 % Drop Place 1 drop into the left eye 3 (three) times a week.     metoprolol succinate (TOPROL-XL) 25 MG 24 hr tablet Take 25 mg by mouth once daily.     NEXIUM 40 mg capsule Take 40 mg by mouth once daily.    nitroGLYCERIN (NITROSTAT) 0.4 MG SL tablet Place 0.4 mg under the tongue every 5 (five) minutes as needed for Chest pain.     omeprazole (PRILOSEC) 40 MG capsule      prednisoLONE acetate (PRED FORTE) 1 % DrpS Place 1 drop into the left eye 2 (two) times daily.     sildenafil (VIAGRA) 100 MG tablet Take 100 mg by mouth as needed for Erectile Dysfunction.     sucralfate (CARAFATE) 1 gram tablet     tamsulosin (FLOMAX) 0.4 mg Cap Take 0.4 mg by mouth once daily.     testosterone cypionate (DEPOTESTOTERONE CYPIONATE) 200 mg/mL injection Inject 200 mg into the muscle every 30 days.     timolol maleate 0.5% (TIMOPTIC) 0.5 % Drop Place 1 drop into both eyes 3 (three) times daily.     TOUJEO SOLOSTAR U-300 INSULIN 300 unit/mL (1.5 mL) InPn pen Inject 45 Units into the skin every evening.     vitamin D3-vitamin K2 (D3 + K2 DOTS) 1000-90 unit-mcg TbDL Take 1 tablet by mouth once daily.      No current facility-administered medications for this visit.        Review of patient's allergies indicates:   Allergen Reactions    Ciprofloxacin     Iodinated contrast media     Penicillins     Sulfa (sulfonamide antibiotics)        History reviewed. No pertinent family history.    Social History     Socioeconomic History    Marital status: Single     Spouse name: Not on file    Number of children: Not on file    Years of education: Not on file    Highest education level: Not on file   Occupational History    Not on file   Social Needs    Financial resource strain: Not on file    Food insecurity     Worry: Not on file     Inability: Not on file    Transportation needs     Medical: Not on file     Non-medical: Not on file   Tobacco Use    Smoking status: Never Smoker   Substance and Sexual Activity    Alcohol use: Not on file    Drug use: Not on file    Sexual activity: Not on file   Lifestyle    Physical activity     Days per week: Not on file     Minutes per session: Not on file    Stress: Not on file   Relationships    Social connections     Talks on phone: Not on file     Gets together: Not on file     Attends Taoism service: Not on file     Active member of club or  organization: Not on file     Attends meetings of clubs or organizations: Not on file     Relationship status: Not on file   Other Topics Concern    Not on file   Social History Narrative    Not on file       Chief Complaint:   Chief Complaint   Patient presents with    Left Shoulder - Pain       History of present illness:  This is a 60-year-old right-hand-dominant male seen for left shoulder pain.  Patient has a history of both shoulders being scoped by Dr. Richey previously.  Patient has had worsening pain over the last several months.  No injury or trauma.  Pain particularly at night.  Loss of range of motion in numerous joints throughout his body.  Pain is a 3/10 at rest and up to a 5/10 at night.  Patient initially had some tingling in his left forearm and hand.  It now goes into the shoulder in even up into his neck.  I gave him a cortisone injection last week and it did not help at all.      Answers for HPI/ROS submitted by the patient on 6/23/2020   Arm pain  unexpected weight change: No  appetite change : No  sleep disturbance: Yes  IMMUNOCOMPROMISED: No  nervous/ anxious: Yes  dysphoric mood: No  rash: No  visual disturbance: No  eye redness: Yes  eye pain: No  ear pain: No  tinnitus: Yes  hearing loss: Yes  sinus pressure : No  nosebleeds: No  enviro allergies: Yes  food allergies: No  cough: No  shortness of breath: No  sweating: No  frequency: No  difficulty urinating: Yes  hematuria: No  chest pain: No  palpitations: No  nausea: No  vomiting: No  diarrhea: No  blood in stool: No  constipation: No  headaches: No  dizziness: No  numbness: Yes  seizures: No  joint swelling: No  myalgia: Yes  weakness: No  back pain: No  Pain Chronicity: new  History of trauma: Yes  Onset: 1 to 4 weeks ago  Frequency: daily  Progression since onset: unchanged  injury location: at home  pain- numeric: 5/10  pain location: left shoulder  pain quality: aching  Radiating Pain: Yes  If your pain is radiating, to what part  of the body?: left arm  Aggravating factors: lying down  fever: No  inability to bear weight: No  itching: No  joint locking: No  limited range of motion: No  stiffness: No  tingling: Yes  Treatments tried: OTC pain meds  physical therapy: not tried  Improvement on treatment: mild      Physical Examination:    Vital Signs:    Vitals:    07/01/20 1132   Temp: 98.4 °F (36.9 °C)       Body mass index is 26.31 kg/m².    This a well-developed, well nourished patient in no acute distress.  They are alert and oriented and cooperative to examination.  Pt. walks without an antalgic gait.      Examination of the left shoulder shows no rashes or erythema. There are no masses, ecchymosis, or atrophy. The patient has full range of motion in forward flexion, external rotation, and internal rotation to the mid T-spine. The patient has moderately positive impingement signs. - Smithfield's test. - Speeds test. Nontender to palpation over a.c. joint. Normal stability anteriorly, posteriorly, and negative sulcus sign. Passive range of motion: Forward flexion of 180°, external rotation at 90° of 90°, internal rotation of 50°, and external rotation at 0° of 50°. 2+ radial pulse. Intact axillary, radial, median and ulnar sensation. 5 out of 5 resisted forward flexion, external rotation, and negative lift off test.  Positive pain with Spurling's test and radiating pain down into the upper back and occasionally into the arm.    Examination of the right shoulder shows no rashes or erythema. There are no masses, ecchymosis, or atrophy. The patient has full range of motion in forward flexion, external rotation, and internal rotation to the mid T-spine. The patient has - impingement signs. - Smithfield's test. - Speeds test. Nontender to palpation over a.c. joint. Normal stability anteriorly, posteriorly, and negative sulcus sign. Passive range of motion: Forward flexion of 180°, external rotation at 90° of 90°, internal rotation of 50°, and  external rotation at 0° of 50°. 2+ radial pulse. Intact axillary, radial, median and ulnar sensation. 5 out of 5 resisted forward flexion, external rotation, and negative lift off test.        X-rays:  X-rays of the left shoulder reviewed which show some very mild glenohumeral arthritis.  Patient has a metal piece of shrapnel within his lateral shoulder and deltoid.     Assessment::  Possible left cervical radiculopathy    Plan:  I reviewed the findings with him today.  We talked about treatment options.  I recommended an MRI to evaluate his neck.  Follow-up after the MRI is completed    This note was created using Dy[employer] voice recognition software that occasionally misinterpreted phrases or words.    Consult note is delivered via Epic messaging service.

## 2020-07-06 DIAGNOSIS — M25.512 LEFT SHOULDER PAIN, UNSPECIFIED CHRONICITY: Primary | ICD-10-CM

## 2020-07-11 ENCOUNTER — HOSPITAL ENCOUNTER (OUTPATIENT)
Dept: RADIOLOGY | Facility: HOSPITAL | Age: 60
Discharge: HOME | End: 2020-07-11
Attending: ORTHOPAEDIC SURGERY
Payer: MEDICARE

## 2020-07-11 DIAGNOSIS — M54.12 LEFT CERVICAL RADICULOPATHY: ICD-10-CM

## 2020-07-11 DIAGNOSIS — M25.512 LEFT SHOULDER PAIN, UNSPECIFIED CHRONICITY: ICD-10-CM

## 2020-07-11 PROCEDURE — 72141 MRI NECK SPINE W/O DYE: CPT | Mod: 26,,, | Performed by: RADIOLOGY

## 2020-07-11 PROCEDURE — 72141 MRI CERVICAL SPINE WITHOUT CONTRAST: ICD-10-PCS | Mod: 26,,, | Performed by: RADIOLOGY

## 2020-07-11 PROCEDURE — 73221 MRI JOINT UPR EXTREM W/O DYE: CPT | Mod: TC,PO,LT

## 2020-07-11 PROCEDURE — 73221 MRI JOINT UPR EXTREM W/O DYE: CPT | Mod: 26,LT,, | Performed by: RADIOLOGY

## 2020-07-11 PROCEDURE — 72141 MRI NECK SPINE W/O DYE: CPT | Mod: TC,PO

## 2020-07-11 PROCEDURE — 73221 MRI SHOULDER WITHOUT CONTRAST LEFT: ICD-10-PCS | Mod: 26,LT,, | Performed by: RADIOLOGY

## 2020-07-14 DIAGNOSIS — M54.12 LEFT CERVICAL RADICULOPATHY: Primary | ICD-10-CM

## 2020-07-15 ENCOUNTER — TELEPHONE (OUTPATIENT)
Dept: NEUROSURGERY | Facility: CLINIC | Age: 60
End: 2020-07-15

## 2020-07-20 ENCOUNTER — HOSPITAL ENCOUNTER (OUTPATIENT)
Dept: RADIOLOGY | Facility: HOSPITAL | Age: 60
Discharge: HOME | End: 2020-07-20
Attending: NEUROLOGICAL SURGERY
Payer: MEDICARE

## 2020-07-20 DIAGNOSIS — M50.222 DISPLACEMENT OF INTERVERTEBRAL DISC AT C5-C6 LEVEL: ICD-10-CM

## 2020-07-20 DIAGNOSIS — M50.221 DISPLACEMENT OF INTERVERTEBRAL DISC AT C4-C5 LEVEL: ICD-10-CM

## 2020-07-20 PROCEDURE — 72052 X-RAY EXAM NECK SPINE 6/>VWS: CPT | Mod: 26,,, | Performed by: RADIOLOGY

## 2020-07-20 PROCEDURE — 72052 XR CERVICAL SPINE 5 VIEW WITH FLEX AND EXT: ICD-10-PCS | Mod: 26,,, | Performed by: RADIOLOGY

## 2020-07-20 PROCEDURE — 72052 X-RAY EXAM NECK SPINE 6/>VWS: CPT | Mod: TC,FY,PO

## 2020-07-21 DIAGNOSIS — M50.222 HERNIATED NUCLEUS PULPOSUS, C5-6 RIGHT: ICD-10-CM

## 2020-07-21 DIAGNOSIS — M50.221 HERNIATED NUCLEUS PULPOSUS, C4-5: Primary | ICD-10-CM

## 2020-08-03 ENCOUNTER — OFFICE VISIT (OUTPATIENT)
Dept: PSYCHIATRY | Facility: CLINIC | Age: 60
End: 2020-08-03
Payer: MEDICARE

## 2020-08-03 VITALS
WEIGHT: 196.56 LBS | DIASTOLIC BLOOD PRESSURE: 66 MMHG | SYSTOLIC BLOOD PRESSURE: 128 MMHG | BODY MASS INDEX: 26.62 KG/M2 | HEIGHT: 72 IN

## 2020-08-03 DIAGNOSIS — F41.1 GENERALIZED ANXIETY DISORDER: Primary | ICD-10-CM

## 2020-08-03 PROCEDURE — 90832 PSYTX W PT 30 MINUTES: CPT | Mod: S$PBB,,, | Performed by: PSYCHOLOGIST

## 2020-08-03 PROCEDURE — 99999 PR PBB SHADOW E&M-EST. PATIENT-LVL IV: ICD-10-PCS | Mod: PBBFAC,,, | Performed by: PSYCHOLOGIST

## 2020-08-03 PROCEDURE — 99214 OFFICE O/P EST MOD 30 MIN: CPT | Mod: PBBFAC,PO | Performed by: PSYCHOLOGIST

## 2020-08-03 PROCEDURE — 99999 PR PBB SHADOW E&M-EST. PATIENT-LVL IV: CPT | Mod: PBBFAC,,, | Performed by: PSYCHOLOGIST

## 2020-08-03 PROCEDURE — 90832 PR PSYCHOTHERAPY W/PATIENT, 30 MIN: ICD-10-PCS | Mod: S$PBB,,, | Performed by: PSYCHOLOGIST

## 2020-08-03 RX ORDER — BUSPIRONE HYDROCHLORIDE 10 MG/1
20 TABLET ORAL 3 TIMES DAILY
Qty: 540 TABLET | Refills: 1 | Status: SHIPPED | OUTPATIENT
Start: 2020-08-03 | End: 2020-11-03

## 2020-08-03 NOTE — PROGRESS NOTES
Outpatient Psychiatry Follow-Up Visit    Clinical Status of Patient: Outpatient (Ambulatory)  08/03/2020     Chief Complaint: 60 year old male presenting today for a follow-up.       Interval History and Content of Current Session:  Interim Events/Subjective Report/Content of Current Session:  follow-up appointment.    Pt is a 60 year old male with past psychiatric hx of generalized anxiety who presents for follow-up treatment. Pt reported sustained, partial improvement in his anxiety on Buspar treatment but with continued sleep difficulties that are exacerbated by pain. Pt said that he continues to pick at his skin and self-soothe with food. Pt finds that his mind is worrying before he goes to bed. We discussed a trial of Remeron and how this might also be helpful for him to reduce/eliminate his klonopin use. Pt given a patient information sheet and will consider a trial and get back to me.    Past Psychiatric hx: Vybriid, Effexor, Prozac, Elavil, Zoloft, Celexa, Lexapro, Trintellix, Cymbalta, Concerta, Strattera, Wellbutrin    Past Medical hx: History reviewed. No pertinent past medical history.     Interim hx:  Medication changes last visit:  none  Anxiety: mild - unchanged  Depression: pt denied     Denies suicidal/homicidal ideations.  Denies hopelessness/worthlessness.    Denies auditory/visual hallucinations      Alcohol: minimal use  Drug: pt denied  Caffeine: minimal use  Tobacco: pt denied      Review of Systems   · PSYCHIATRIC: Pertinent items are noted in the narrative.        CONSTITUTIONAL: weight stable     Past Medical, Family and Social History: The patient's past medical, family and social history have been reviewed and updated as appropriate within the electronic medical record. See encounter notes.     Current Psychiatric Medication:  Buspar 20mg TID and Klonopin 0.5-1mg Q HS PRN     Compliance: yes      Side effects: none     Risk Parameters:  Patient reports no suicidal ideation  Patient  reports no homicidal ideation  Patient reports no self-injurious behavior  Patient reports no violent behavior     Exam (detailed: at least 9 elements; comprehensive: all 15 elements)   Constitutional  Vitals:  Most recent vital signs, dated less than 90 days prior to this appointment, were reviewed. BP: (128)/(66)       General:  unremarkable, age appropriate, casual attire, good eye contact, good rapport       Musculoskeletal  Muscle Strength/Tone:  no flaccidity, no tremor    Gait & Station:  normal      Psychiatric                       Speech:  normal tone, normal rate, rhythm, and volume   Mood & Affect:   Depressed, anxious         Thought Process:   Goal directed; Linear    Associations:   intact   Thought Content:   No SI/HI, delusions, or paranoia, no AV/VH   Insight & Judgement:   Good, adequate to circumstances   Orientation:   grossly intact; alert and oriented x 4    Memory:  intact for content of interview    Language:  grossly intact, can repeat    Attention Span  : Grossly intact for content of interview   Fund of Knowledge:   intact and appropriate to age and level of education        Assessment and Diagnosis   Status/Progress: unchanged     Impression: Pt appears to struggle with generalized anxiety that causes high subjective distress but conventional treatments cause lethargy and low motivation. Pt is requesting to try another medication trial.    Diagnosis: Generalized Anxiety Disorder    Intervention/Counseling/Treatment Plan   · Medication Management:      1. Continue Buspar to 20mg TID     2. Use Klonopin 0.5-1mg Q HS PRN sparingly    3. Consider Remeron 15mg Q HS trial     4. Call to report any worsening of symptoms or problems with the medication. Pt instructed to go to ER with thoughts of harming self, others    Psychotherapy:   · Target symptoms: anxiety  · Why chosen therapy is appropriate versus another modality: CBT used; relevant to diagnosis, patient responds to this  modality  · Outcome monitoring methods: self-report, observation  · Therapeutic intervention type: Cognitive Behavioral Therapy  · Topics discussed/themes: building skills sets for symptom management, symptom recognition, nutrition, exercise  · The patient's response to the intervention is good  · Patient's response to treatment is: good.   · The patient's progress toward treatment goals: unchanged  · Duration of intervention: 20 minutes     Return to clinic: 6 months or earlier PRN    -Spent 20min face to face with the pt; >50% time spent in counseling   -Cognitive-Behavioral/Supportive therapy and psychoeducation provided  -R/B/SE's of medications discussed with the pt who expresses understanding and chooses to take medications as prescribed.   -Pt instructed to call clinic, 911 or go to nearest emergency room if sxs worsen or pt is in   crisis. The pt expresses understanding.    Jaret Wise, PhD, MP     Antidepressant/Antianxiety Medication Initiation:  Patient informed of risks, benefits, and potential side effects of medication and accepts informed consent.  Common side effects include nausea, fatigue, headache, insomnia., Specifically discussed the possibility of new or worsening suicidal thoughts/depression.  Patient instructed to stop the medication immediately and seek urgent treatment if this occurs., Patient instructed not to abruptly discontinue medication without physician guidance except in cases of sudden onset or worsening of SI.        Sleep Aid Initiation:  Patient advised of potential side effects of medication including sleep walking or other complex behaviors.  Patient advised not to mix medication with alcohol, to go to bed immediately after taking, and to stop at first sign of any unusual behaviors.

## 2020-08-03 NOTE — LETTER
August 3, 2020        Ernie Brannon MD  [address] [facility]  [address]  [place] [address]             [facility]  [address]  [facility] [address]  Phone: [phone]   Patient: Albert David   MR Number: [id number]   YOB: 1960   Date of Visit: 8/3/2020       Dear Dr. Brannon:    Thank you for referring Albert David to me for evaluation. Below are the relevant portions of my assessment and plan of care.    Pt appears stable on Buspar 20mg TID and klonopin 0.5-1mg Q HS. Suggested Remeron trial and pt will consider. Will continue to follow and monitor.     If you have questions, please do not hesitate to call me. I look forward to following Albert along with you.    Sincerely,      Jaret Wise, PhD, MP           CC  No Recipients

## 2020-09-14 ENCOUNTER — TELEPHONE (OUTPATIENT)
Dept: RADIOLOGY | Facility: HOSPITAL | Age: 60
End: 2020-09-14

## 2020-09-17 ENCOUNTER — OFFICE VISIT (OUTPATIENT)
Dept: NEUROSURGERY | Facility: CLINIC | Age: 60
End: 2020-09-17
Payer: MEDICARE

## 2020-09-17 VITALS
HEIGHT: 72 IN | SYSTOLIC BLOOD PRESSURE: 106 MMHG | HEART RATE: 82 BPM | WEIGHT: 195 LBS | DIASTOLIC BLOOD PRESSURE: 57 MMHG | BODY MASS INDEX: 26.41 KG/M2

## 2020-09-17 DIAGNOSIS — M47.812 CERVICAL SPONDYLOSIS: Primary | ICD-10-CM

## 2020-09-17 PROCEDURE — 99999 PR PBB SHADOW E&M-EST. PATIENT-LVL II: ICD-10-PCS | Mod: PBBFAC,,, | Performed by: NEUROLOGICAL SURGERY

## 2020-09-17 PROCEDURE — 99212 OFFICE O/P EST SF 10 MIN: CPT | Mod: PBBFAC,PN | Performed by: NEUROLOGICAL SURGERY

## 2020-09-17 PROCEDURE — 99205 OFFICE O/P NEW HI 60 MIN: CPT | Mod: S$PBB,,, | Performed by: NEUROLOGICAL SURGERY

## 2020-09-17 PROCEDURE — 99999 PR PBB SHADOW E&M-EST. PATIENT-LVL II: CPT | Mod: PBBFAC,,, | Performed by: NEUROLOGICAL SURGERY

## 2020-09-17 PROCEDURE — 99205 PR OFFICE/OUTPT VISIT, NEW, LEVL V, 60-74 MIN: ICD-10-PCS | Mod: S$PBB,,, | Performed by: NEUROLOGICAL SURGERY

## 2020-09-17 NOTE — PROGRESS NOTES
Neurosurgery History & Physical    Patient ID: Albert David is a 60 y.o. male.    Chief Complaint   Patient presents with    Cervical Spine Pain (C-spine)     neck pain, left shoulder and finger numbness, pinching pain that goes into shoulder blades when he puts head down, pain today 0/10       History of Present Illness:     Mr. David is a 60 year old  male with CAD s/p CABG who presents today for evaluation after being referred by Dr. James.     He is established with Dr. James for left shoulder pain. This began in June with no obvious trauma/injury. This pain interfered greatly with his sleep causing him to sleep upright in a chair to avoid pain. He had shoulder injection in July with minimal relief.     His shoulder pain has improved but is still present. The pain travels to his 1st-3rd digits. The pain increases with neck extension and prolonged intervals in this position causes subjective numbness in LUE. He has participated in 3 weeks of physical therapy over the last month with some mild improvement. He feels some subjective mild left biceps weakness.     He has been evaluated by Dr. Alcocer who did not recommend surgery and suggested he follow up with Pain Management for left C4-5, C5-6 TFESI but has not seen them yet. He has been evaluated by him for low back pain in the past that was managed with injections. 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     He is s/p bilateral carpal tunnel release in the remote past. He experienced some wrist numbness prior to surgery but symptoms feel differently overall.     Review of Systems   Constitutional: Negative for activity change, appetite change and fatigue.   HENT: Negative for dental problem and hearing loss.    Eyes: Negative for photophobia and visual disturbance.   Respiratory: Negative for cough and shortness of breath.    Cardiovascular: Negative for leg swelling.    Gastrointestinal: Negative for nausea and vomiting.   Endocrine: Negative for cold intolerance and heat intolerance.   Genitourinary: Negative for decreased urine volume, difficulty urinating and urgency.   Musculoskeletal: Positive for neck pain. Negative for arthralgias, back pain and gait problem.   Skin: Negative for wound.   Allergic/Immunologic: Negative for immunocompromised state.   Neurological: Positive for weakness and numbness. Negative for dizziness and headaches.   Psychiatric/Behavioral: Negative for agitation. The patient is not nervous/anxious.        No past medical history on file.  Social History     Socioeconomic History    Marital status: Single     Spouse name: Not on file    Number of children: Not on file    Years of education: Not on file    Highest education level: Not on file   Occupational History    Not on file   Social Needs    Financial resource strain: Not on file    Food insecurity     Worry: Not on file     Inability: Not on file    Transportation needs     Medical: Not on file     Non-medical: Not on file   Tobacco Use    Smoking status: Never Smoker   Substance and Sexual Activity    Alcohol use: Not on file    Drug use: Not on file    Sexual activity: Not on file   Lifestyle    Physical activity     Days per week: Not on file     Minutes per session: Not on file    Stress: Not on file   Relationships    Social connections     Talks on phone: Not on file     Gets together: Not on file     Attends Cheondoism service: Not on file     Active member of club or organization: Not on file     Attends meetings of clubs or organizations: Not on file     Relationship status: Not on file   Other Topics Concern    Not on file   Social History Narrative    Not on file     No family history on file.  Review of patient's allergies indicates:   Allergen Reactions    Ciprofloxacin     Iodinated contrast media     Penicillins     Sulfa (sulfonamide antibiotics)        Current  Outpatient Medications:     aspirin (ECOTRIN) 81 MG EC tablet, Take 81 mg by mouth once daily., Disp: , Rfl:     brimonidine-timolol (COMBIGAN) 0.2-0.5 % Drop, 1 drop. , Disp: , Rfl:     brinzolamide (AZOPT) 1 % ophthalmic suspension, Place 1 drop into the left eye 3 (three) times daily. , Disp: , Rfl:     busPIRone (BUSPAR) 10 MG tablet, Take 2 tablets (20 mg total) by mouth 3 (three) times daily., Disp: 540 tablet, Rfl: 1    CIALIS 5 mg tablet, Take 5 mg by mouth once daily., Disp: , Rfl:     clonazePAM (KLONOPIN) 1 MG tablet, Take 0.5 mg by mouth every evening. , Disp: , Rfl:     coenzyme Q10 100 mg capsule, Take 100 mg by mouth every evening. , Disp: , Rfl:     CONTOUR NEXT TEST STRIPS Strp, , Disp: , Rfl:     CRESTOR 40 mg Tab, Take 40 mg by mouth once daily. , Disp: , Rfl:     fish oil-omega-3 fatty acids 300-1,000 mg capsule, Take 1,200 g by mouth 2 (two) times daily. , Disp: , Rfl:     fluocinonide (LIDEX) 0.05 % ointment, aaa arms bid x 1-2 weeks, avoid chronic use, Disp: 60 g, Rfl: 1    HUMALOG 100 unit/mL injection, Inject 0-10 Units into the skin 3 (three) times daily as needed for High Blood Sugar. Blood Sugar  Low Dose     No Insulin 130-150  2 units 151-180  4 units  181-200  6 units 201-240  8 units  241-300<  10 units, Disp: , Rfl:     bnqpyfwqh-[id number]-algal oil (METANX, ALGAL OIL,) 3 mg-35 mg-2 mg -90.314 mg Cap, Take 1 capsule by mouth 2 (two) times daily., Disp: 180 capsule, Rfl: 3    lisinopril (PRINIVIL,ZESTRIL) 20 MG tablet, Take 20 mg by mouth. , Disp: , Rfl:     lisinopril 10 MG tablet, Take 15 mg by mouth once daily. , Disp: , Rfl:     LUMIGAN 0.01 % Drop, Place 1 drop into the left eye 3 (three) times a week. , Disp: , Rfl: 4    metoprolol succinate (TOPROL-XL) 25 MG 24 hr tablet, Take 25 mg by mouth once daily. , Disp: , Rfl:     NEXIUM 40 mg capsule, Take 40 mg by mouth once daily., Disp: , Rfl:     nitroGLYCERIN (NITROSTAT) 0.4 MG SL tablet, Place 0.4 mg under  the tongue every 5 (five) minutes as needed for Chest pain. , Disp: , Rfl: 5    omeprazole (PRILOSEC) 40 MG capsule, , Disp: , Rfl:     prednisoLONE acetate (PRED FORTE) 1 % DrpS, Place 1 drop into the left eye 2 (two) times daily. , Disp: , Rfl: 1    sildenafil (VIAGRA) 100 MG tablet, Take 100 mg by mouth as needed for Erectile Dysfunction. , Disp: , Rfl: 6    sucralfate (CARAFATE) 1 gram tablet, , Disp: , Rfl:     tamsulosin (FLOMAX) 0.4 mg Cap, Take 0.4 mg by mouth once daily. , Disp: , Rfl:     testosterone cypionate (DEPOTESTOTERONE CYPIONATE) 200 mg/mL injection, Inject 200 mg into the muscle every 30 days. , Disp: , Rfl: 0    timolol maleate 0.5% (TIMOPTIC) 0.5 % Drop, Place 1 drop into both eyes 3 (three) times daily. , Disp: , Rfl: 5    TOUJEO SOLOSTAR U-300 INSULIN 300 unit/mL (1.5 mL) InPn pen, Inject 45 Units into the skin every evening. , Disp: , Rfl:     vitamin D3-vitamin K2 (D3 + K2 DOTS) 1000-90 unit-mcg TbDL, Take 1 tablet by mouth once daily. , Disp: , Rfl:   Blood pressure (!) 106/57, pulse 82, height 6' (1.829 m), weight 88.5 kg (195 lb).      Neurologic Exam     Mental Status   Oriented to person, place, and time.   Level of consciousness: alert    Cranial Nerves   Cranial nerves II through XII intact.     Motor Exam   Muscle bulk: normal  Overall muscle tone: normal  Right arm tone: normal  Left arm tone: normal  Right arm pronator drift: absent  Left arm pronator drift: absent  Right leg tone: normal  Left leg tone: normal    Strength   Strength 5/5 throughout.     Sensory Exam   Light touch normal.   Sensory deficit distribution on left: C6    Gait, Coordination, and Reflexes     Gait  Gait: normal    Reflexes   Right biceps: 1+  Left biceps: 0  Right triceps: 1+  Left triceps: 1+  Right patellar: 2+  Left patellar: 2+  Right achilles: 1+  Left achilles: 1+  Right Dean: absent  Left Dean: absent  Right ankle clonus: absent  Left ankle clonus: absent      Physical  Exam  Constitutional:       Appearance: He is well-developed.   HENT:      Head: Normocephalic and atraumatic.   Eyes:      Conjunctiva/sclera: Conjunctivae normal.   Neck:      Musculoskeletal: Normal range of motion.   Abdominal:      Tenderness: There is no guarding.   Musculoskeletal: Normal range of motion.   Skin:     General: Skin is warm and dry.   Neurological:      Mental Status: He is alert and oriented to person, place, and time.      Gait: Gait is intact.      Deep Tendon Reflexes: Strength normal.      Reflex Scores:       Tricep reflexes are 1+ on the right side and 1+ on the left side.       Bicep reflexes are 1+ on the right side and 0 on the left side.       Patellar reflexes are 2+ on the right side and 2+ on the left side.       Achilles reflexes are 1+ on the right side and 1+ on the left side.        Oswestry: 20%  PHQ: 5    Imaging:    MRI Cervical spine dated 7/11/20 reviewed. Imaging demonstrates multilevel degenerative changes worse at C5-6.  At C4-5, there is a broad based disc with L>R foraminal stenosis but overall mild to moderate.  At C5-6, there is broad based disc osteophyte complex and facet arthropathy. There is severe bilateral foraminal stenosis and moderate central stenosis.    Cervical XR ap/lat flex/ex demonstrates no evidence of dynamic instability.    EMG 7/30/20 demonstrates no active radiculopathy. There is evidence of median, ulnar and radial neuropathies at the level of the wrist. There was also evidence of significant ulnar neuropathy at the elbow.     Assessment & Plan:   Mr. David is a 60 year old  male with C5-6 stenosis on imaging. On neurologic exam, he is grossly intact with diminished left biceps reflex and mild C6 sensory deficit. Though EMG shows no radiculopathy, his symptoms could likely be related to imaging findings at C5-6. We have discussed possible treatment options with Pain Management and continued physical therapy versus surgery with C5-6  ACDF. I recommend he maximize conservative treatment prior to surgical consideration though this is an option in the future. We will follow up with patient after evaluation and injections with Pain Management. He should contact our office with any questions/concerns or if he develops new or worsening symptoms.

## 2020-09-17 NOTE — LETTER
September 20, 2020      Vipul James MD  [address] [place] [address] Dr Candice CALVILLO [id number]           [facility]  [address] [place] [address]  Phone: [phone]  Fax: [phone]          Patient: Albert David   MR Number: [id number]   YOB: 1960   Date of Visit: 9/17/2020       Dear Dr. Vipul James:    Thank you for referring Albert David to me for evaluation. Attached you will find relevant portions of my assessment and plan of care.    If you have questions, please do not hesitate to call me. I look forward to following Albert David along with you.    Sincerely,    iVpul Boss MD    Enclosure  CC:  No Recipients    If you would like to receive this communication electronically, please contact [email or web address] or [phone] to request more information on [facility] access.    For providers and/or their staff who would like to refer a patient to [facility], please contact us through our one-stop-shop provider referral line, [facility], at [phone].    If you feel you have received this communication in error or would no longer like to receive these types of communications, please e-mail [email or web address]

## 2020-09-22 ENCOUNTER — PATIENT MESSAGE (OUTPATIENT)
Dept: NEUROSURGERY | Facility: CLINIC | Age: 60
End: 2020-09-22

## 2020-10-29 ENCOUNTER — OFFICE VISIT (OUTPATIENT)
Dept: PAIN MEDICINE | Facility: CLINIC | Age: 60
End: 2020-10-29
Payer: MEDICARE

## 2020-10-29 VITALS
TEMPERATURE: 98 F | HEIGHT: 72 IN | SYSTOLIC BLOOD PRESSURE: 100 MMHG | OXYGEN SATURATION: 98 % | DIASTOLIC BLOOD PRESSURE: 59 MMHG | BODY MASS INDEX: 26.82 KG/M2 | WEIGHT: 198 LBS | HEART RATE: 70 BPM

## 2020-10-29 DIAGNOSIS — Z13.9 SCREENING PROCEDURE: ICD-10-CM

## 2020-10-29 DIAGNOSIS — M54.12 CERVICAL RADICULOPATHY: Primary | ICD-10-CM

## 2020-10-29 DIAGNOSIS — M50.30 DDD (DEGENERATIVE DISC DISEASE), CERVICAL: ICD-10-CM

## 2020-10-29 PROCEDURE — 99204 PR OFFICE/OUTPT VISIT, NEW, LEVL IV, 45-59 MIN: ICD-10-PCS | Mod: S$PBB,,, | Performed by: ANESTHESIOLOGY

## 2020-10-29 PROCEDURE — 99999 PR PBB SHADOW E&M-EST. PATIENT-LVL IV: CPT | Mod: PBBFAC,,, | Performed by: ANESTHESIOLOGY

## 2020-10-29 PROCEDURE — 99204 OFFICE O/P NEW MOD 45 MIN: CPT | Mod: S$PBB,,, | Performed by: ANESTHESIOLOGY

## 2020-10-29 PROCEDURE — 99214 OFFICE O/P EST MOD 30 MIN: CPT | Mod: PBBFAC,PN | Performed by: ANESTHESIOLOGY

## 2020-10-29 PROCEDURE — 99999 PR PBB SHADOW E&M-EST. PATIENT-LVL IV: ICD-10-PCS | Mod: PBBFAC,,, | Performed by: ANESTHESIOLOGY

## 2020-10-29 RX ORDER — SODIUM CHLORIDE, SODIUM LACTATE, POTASSIUM CHLORIDE, CALCIUM CHLORIDE 600; 310; 30; 20 MG/100ML; MG/100ML; MG/100ML; MG/100ML
INJECTION, SOLUTION INTRAVENOUS CONTINUOUS
Status: CANCELLED | OUTPATIENT
Start: 2020-11-16

## 2020-10-29 NOTE — H&P (VIEW-ONLY)
This note was completed with dictation software and grammatical errors may exist.    CC:Neck and arm pain    HPI:  The patient is a 60-year-old man with a history of CAD, CABG, diabetes who presents in referral from Dr. Boss for neck pain and left shoulder pain.  The patient reports having a history of left shoulder pain, degeneration, had seen orthopedics in the past and have discussed possible left shoulder replacement but he is trying to hold off on this.  He states that about 6 months ago he had some numbness in his left arm but 3 months ago became very painful.  He was having so much pain that it caused him difficulty sleeping.  He states the pain starts in the left trapezius, left shoulder, left forearm into the 1st 3 fingers with numbness and tingling and pain.  He states that if he turns his head to the left and looks down he gets pain and numbness in this distribution.  He has been doing physical therapy now and reports that the pain has improved a great deal but he is still having the numbness and if he works out too much she does get pain.  He does self defense courses and states that at times, will have pain later on that night.  He denies any weakness in the arm, denies any balance issues. He has been seen by Dr. Alcocer, neurosurgery and more recently by Dr. Vipul Boss, neurosurgery who felt that his pain may be related to C5/6 issues.  They have discussed continuing physical therapy and possibly interventional procedures.     Reports that his last hemoglobin A1c was about 6.8    Pain intervention history:  He has a history of an injection for his lumbar spine which help a great deal.    Antineuropathics:  NSAIDs:  Physical therapy:  Has done physical therapy with improvement in his neck and left arm pain  Antidepressants:  Muscle relaxers:  Klonopin  Opioids:  Antiplatelets/Anticoagulants:  ASA 81      ROS:  He reports joint stiffness and anxiety.  Balance of review of systems is negative.    No  results found for: LABA1C, HGBA1C    Lab Results   Component Value Date    WBC 5.23 12/10/2019    HGB 14.9 12/10/2019    HCT 43.9 12/10/2019    MCV 86 12/10/2019     12/10/2019             Past Medical History:   Diagnosis Date    Anxiety     Coronary artery disease     Diabetes mellitus, type 2     Hypertension        Past Surgical History:   Procedure Laterality Date    CORONARY ARTERY BYPASS GRAFT         Social History     Socioeconomic History    Marital status: Single     Spouse name: Not on file    Number of children: Not on file    Years of education: Not on file    Highest education level: Not on file   Occupational History    Not on file   Social Needs    Financial resource strain: Not on file    Food insecurity     Worry: Not on file     Inability: Not on file    Transportation needs     Medical: Not on file     Non-medical: Not on file   Tobacco Use    Smoking status: Never Smoker   Substance and Sexual Activity    Alcohol use: Not on file    Drug use: Not on file    Sexual activity: Not on file   Lifestyle    Physical activity     Days per week: Not on file     Minutes per session: Not on file    Stress: Not on file   Relationships    Social connections     Talks on phone: Not on file     Gets together: Not on file     Attends Nondenominational service: Not on file     Active member of club or organization: Not on file     Attends meetings of clubs or organizations: Not on file     Relationship status: Not on file   Other Topics Concern    Not on file   Social History Narrative    Not on file         Medications/Allergies: See med card    Vitals:    10/29/20 0907   BP: (!) 100/59   Pulse: 70   Temp: 97.7 °F (36.5 °C)   TempSrc: Temporal   SpO2: 98%   Weight: 89.8 kg (197 lb 15.6 oz)   Height: 6' (1.829 m)   PainSc:   1   PainLoc: Arm         Physical exam:  Gen: A and O x3, pleasant, well-groomed  Skin: No rashes or obvious lesions  HEENT: PERRLA, no obvious deformities on ears or  in canals.Trachea midline.  CVS: Regular rate and rhythm, normal palpable pulses.  Resp: Clear to auscultation bilaterally, no wheezes or rales.  Abdomen: Soft, NT/ND.  Musculoskeletal: Able to heel walk, toe walk. No antalgic gait.     Neuro:  Upper extremities: 5/5 strength bilaterally   Reflexes: Brachioradialis 2+, Bicep 2+, Tricep 2+.   Sensory: Intact and symmetrical to light touch and pinprick in C2-T1 dermatomes bilaterally, except for some decreased sensation to light touch over the 1st through 3rd fingers.    Cervical Spine:  Cervical spine: ROM is full in flexion, extension and lateral rotation without increased pain except for left lateral rotation and flexion causing left shoulder and arm pain   Spurling's maneuver causes left arm pain.  Myofascial exam: No Tenderness to palpation across cervical paraspinous region bilaterally.    Imagin20 MRI C-spine:   There is degenerative disc desiccation at virtually every cervical level with disc space narrowing and marginal osteophyte formation observed at C5-C6..   The cervical spinal cord is normal in signal intensity without evidence of cord edema, myelomalacia, or cord syrinx.  No epidural fluid collections or masses.   The incidentally observed soft tissues of the neck show no significant abnormalities.   C2-C3: There is left ligamentum flavum thickening.  There is bilateral facet arthropathy, left greater than right.  No disc protrusion or extrusion. No central canal stenosis or neuroforaminal stenosis.   C3-C4: There is bilateral hypertrophic facet arthropathy.  There is minimal left uncovertebral spurring.  There is, at most, mild bilateral neuroforaminal stenosis.  There is effacement of the anterior CSF sleeve.  No central canal stenosis.   C4-C5: There is bilateral uncovertebral spurring, left greater than right, and there is prominent left facet arthropathy.  There is severe left neuroforaminal stenosis.  Please correlate clinically for  symptoms referable to the left C5 nerve.  No right neuroforaminal stenosis.  There is effacement of the anterior CSF sleeve.  No overall central canal stenosis.   C5-C6: There is prominent bilateral uncovertebral spurring and bilateral facet arthropathy, left greater than right.  There is a broad central disc protrusion superimposed upon a posterior disc osteophyte complex.  There is moderate central canal stenosis and flattening of the ventral cord.  There is ligamentum flavum thickening.  There is moderate right and severe left neuroforaminal stenosis.  Please correlate clinically for symptoms referable to the C6 nerves, left greater than right.   C6-C7: There is ligamentum flavum thickening and bilateral uncovertebral spurring, right greater than left.  No left neuroforaminal stenosis.  There is, at most, mild right neuroforaminal stenosis.  There is mild overall central canal stenosis.  C7-T1: There is bilateral facet arthropathy and ligamentum flavum thickening.  There is right uncovertebral spurring.  No central canal or neuroforaminal stenosis.    7/11/20 MRI Left shoulder:  A small fragment of shrapnel in the superficial aspect of the lateral deltoid muscle causes metallic artifact.  There is degenerative arthrosis of the glenohumeral joint with joint space narrowing, articular cartilage thinning, and degenerative fraying of the glenoid labrum.  A very small joint effusion is also present.  There is degenerative tendinosis of the supraspinatus tendon without evidence of significant tear.  Other rotator cuff tendons appear normal.  The acromioclavicular joint is relatively well maintained.  Visualized ligaments appear intact.  Bone marrow signal appears normal.    Assessment:   The patient is a 60-year-old man with a history of CAD, CABG, diabetes who presents in referral from Dr. Boss for neck pain and left shoulder pain.   1. Cervical radiculopathy  Vital signs    Place 18-22 [facility]      Verify informed consent    Notify physician     Notify physician     Notify physician (specify)    Diet NPO    Case Request Operating Room: Injection-steroid-epidural-cervical to left    Place in Outpatient    lactated ringers infusion   2. DDD (degenerative disc disease), cervical     3. Screening procedure  COVID-19 Routine Screening         Plan:  1.  We discussed his symptoms, reviewed her cervical spine MRI which does show some canal narrowing, flattening of the cord more to the left side and severe foraminal narrowing out to the left side which seems correspond with his symptoms.  We discussed the role of epidural steroid injections and he would like to proceed.  I am going to set him up with an interlaminar injection and have him follow up in several weeks after the injection.  If for some reason he is not getting significant benefit we could always try transforaminal injection at C5/6.    Thank you for referring this interesting patient, and I look forward to continuing to collaborate in his care.

## 2020-10-29 NOTE — PROGRESS NOTES
This note was completed with dictation software and grammatical errors may exist.    CC:Neck and arm pain    HPI:  The patient is a 60-year-old man with a history of CAD, CABG, diabetes who presents in referral from Dr. Boss for neck pain and left shoulder pain.  The patient reports having a history of left shoulder pain, degeneration, had seen orthopedics in the past and have discussed possible left shoulder replacement but he is trying to hold off on this.  He states that about 6 months ago he had some numbness in his left arm but 3 months ago became very painful.  He was having so much pain that it caused him difficulty sleeping.  He states the pain starts in the left trapezius, left shoulder, left forearm into the 1st 3 fingers with numbness and tingling and pain.  He states that if he turns his head to the left and looks down he gets pain and numbness in this distribution.  He has been doing physical therapy now and reports that the pain has improved a great deal but he is still having the numbness and if he works out too much she does get pain.  He does self defense courses and states that at times, will have pain later on that night.  He denies any weakness in the arm, denies any balance issues. He has been seen by Dr. Alcocer, neurosurgery and more recently by Dr. Vipul Boss, neurosurgery who felt that his pain may be related to C5/6 issues.  They have discussed continuing physical therapy and possibly interventional procedures.     Reports that his last hemoglobin A1c was about 6.8    Pain intervention history:  He has a history of an injection for his lumbar spine which help a great deal.    Antineuropathics:  NSAIDs:  Physical therapy:  Has done physical therapy with improvement in his neck and left arm pain  Antidepressants:  Muscle relaxers:  Klonopin  Opioids:  Antiplatelets/Anticoagulants:  ASA 81      ROS:  He reports joint stiffness and anxiety.  Balance of review of systems is negative.    No  results found for: LABA1C, HGBA1C    Lab Results   Component Value Date    WBC 5.23 12/10/2019    HGB 14.9 12/10/2019    HCT 43.9 12/10/2019    MCV 86 12/10/2019     12/10/2019             Past Medical History:   Diagnosis Date    Anxiety     Coronary artery disease     Diabetes mellitus, type 2     Hypertension        Past Surgical History:   Procedure Laterality Date    CORONARY ARTERY BYPASS GRAFT         Social History     Socioeconomic History    Marital status: Single     Spouse name: Not on file    Number of children: Not on file    Years of education: Not on file    Highest education level: Not on file   Occupational History    Not on file   Social Needs    Financial resource strain: Not on file    Food insecurity     Worry: Not on file     Inability: Not on file    Transportation needs     Medical: Not on file     Non-medical: Not on file   Tobacco Use    Smoking status: Never Smoker   Substance and Sexual Activity    Alcohol use: Not on file    Drug use: Not on file    Sexual activity: Not on file   Lifestyle    Physical activity     Days per week: Not on file     Minutes per session: Not on file    Stress: Not on file   Relationships    Social connections     Talks on phone: Not on file     Gets together: Not on file     Attends Taoism service: Not on file     Active member of club or organization: Not on file     Attends meetings of clubs or organizations: Not on file     Relationship status: Not on file   Other Topics Concern    Not on file   Social History Narrative    Not on file         Medications/Allergies: See med card    Vitals:    10/29/20 0907   BP: (!) 100/59   Pulse: 70   Temp: 97.7 °F (36.5 °C)   TempSrc: Temporal   SpO2: 98%   Weight: 89.8 kg (197 lb 15.6 oz)   Height: 6' (1.829 m)   PainSc:   1   PainLoc: Arm         Physical exam:  Gen: A and O x3, pleasant, well-groomed  Skin: No rashes or obvious lesions  HEENT: PERRLA, no obvious deformities on ears or  in canals.Trachea midline.  CVS: Regular rate and rhythm, normal palpable pulses.  Resp: Clear to auscultation bilaterally, no wheezes or rales.  Abdomen: Soft, NT/ND.  Musculoskeletal: Able to heel walk, toe walk. No antalgic gait.     Neuro:  Upper extremities: 5/5 strength bilaterally   Reflexes: Brachioradialis 2+, Bicep 2+, Tricep 2+.   Sensory: Intact and symmetrical to light touch and pinprick in C2-T1 dermatomes bilaterally, except for some decreased sensation to light touch over the 1st through 3rd fingers.    Cervical Spine:  Cervical spine: ROM is full in flexion, extension and lateral rotation without increased pain except for left lateral rotation and flexion causing left shoulder and arm pain   Spurling's maneuver causes left arm pain.  Myofascial exam: No Tenderness to palpation across cervical paraspinous region bilaterally.    Imagin20 MRI C-spine:   There is degenerative disc desiccation at virtually every cervical level with disc space narrowing and marginal osteophyte formation observed at C5-C6..   The cervical spinal cord is normal in signal intensity without evidence of cord edema, myelomalacia, or cord syrinx.  No epidural fluid collections or masses.   The incidentally observed soft tissues of the neck show no significant abnormalities.   C2-C3: There is left ligamentum flavum thickening.  There is bilateral facet arthropathy, left greater than right.  No disc protrusion or extrusion. No central canal stenosis or neuroforaminal stenosis.   C3-C4: There is bilateral hypertrophic facet arthropathy.  There is minimal left uncovertebral spurring.  There is, at most, mild bilateral neuroforaminal stenosis.  There is effacement of the anterior CSF sleeve.  No central canal stenosis.   C4-C5: There is bilateral uncovertebral spurring, left greater than right, and there is prominent left facet arthropathy.  There is severe left neuroforaminal stenosis.  Please correlate clinically for  symptoms referable to the left C5 nerve.  No right neuroforaminal stenosis.  There is effacement of the anterior CSF sleeve.  No overall central canal stenosis.   C5-C6: There is prominent bilateral uncovertebral spurring and bilateral facet arthropathy, left greater than right.  There is a broad central disc protrusion superimposed upon a posterior disc osteophyte complex.  There is moderate central canal stenosis and flattening of the ventral cord.  There is ligamentum flavum thickening.  There is moderate right and severe left neuroforaminal stenosis.  Please correlate clinically for symptoms referable to the C6 nerves, left greater than right.   C6-C7: There is ligamentum flavum thickening and bilateral uncovertebral spurring, right greater than left.  No left neuroforaminal stenosis.  There is, at most, mild right neuroforaminal stenosis.  There is mild overall central canal stenosis.  C7-T1: There is bilateral facet arthropathy and ligamentum flavum thickening.  There is right uncovertebral spurring.  No central canal or neuroforaminal stenosis.    7/11/20 MRI Left shoulder:  A small fragment of shrapnel in the superficial aspect of the lateral deltoid muscle causes metallic artifact.  There is degenerative arthrosis of the glenohumeral joint with joint space narrowing, articular cartilage thinning, and degenerative fraying of the glenoid labrum.  A very small joint effusion is also present.  There is degenerative tendinosis of the supraspinatus tendon without evidence of significant tear.  Other rotator cuff tendons appear normal.  The acromioclavicular joint is relatively well maintained.  Visualized ligaments appear intact.  Bone marrow signal appears normal.    Assessment:   The patient is a 60-year-old man with a history of CAD, CABG, diabetes who presents in referral from Dr. Boss for neck pain and left shoulder pain.   1. Cervical radiculopathy  Vital signs    Place 18-22 [facility]      Verify informed consent    Notify physician     Notify physician     Notify physician (specify)    Diet NPO    Case Request Operating Room: Injection-steroid-epidural-cervical to left    Place in Outpatient    lactated ringers infusion   2. DDD (degenerative disc disease), cervical     3. Screening procedure  COVID-19 Routine Screening         Plan:  1.  We discussed his symptoms, reviewed her cervical spine MRI which does show some canal narrowing, flattening of the cord more to the left side and severe foraminal narrowing out to the left side which seems correspond with his symptoms.  We discussed the role of epidural steroid injections and he would like to proceed.  I am going to set him up with an interlaminar injection and have him follow up in several weeks after the injection.  If for some reason he is not getting significant benefit we could always try transforaminal injection at C5/6.    Thank you for referring this interesting patient, and I look forward to continuing to collaborate in his care.

## 2020-10-29 NOTE — LETTER
October 29, 2020      Vipul Boss MD  [address] [facility] [address]  [address]  [facility] [address]           [facility]  [address] [place] [address]  Phone: [phone]  Fax: [phone]          Patient: Albert David   MR Number: [id number]   YOB: 1960   Date of Visit: 10/29/2020       Dear Dr. Vipul Boss:    Thank you for referring Albert David to me for evaluation. Attached you will find relevant portions of my assessment and plan of care.    If you have questions, please do not hesitate to call me. I look forward to following Albert David along with you.    Sincerely,    Felipe Mathews MD    Enclosure  CC:  No Recipients    If you would like to receive this communication electronically, please contact [email or web address] or [phone] to request more information on [employer] access.    For providers and/or their staff who would like to refer a patient to [facility], please contact us through our one-stop-shop provider referral line, [facility], at [phone].    If you feel you have received this communication in error or would no longer like to receive these types of communications, please e-mail [email or web address]

## 2020-11-03 ENCOUNTER — OFFICE VISIT (OUTPATIENT)
Dept: PSYCHIATRY | Facility: CLINIC | Age: 60
End: 2020-11-03
Payer: MEDICARE

## 2020-11-03 VITALS
HEART RATE: 55 BPM | SYSTOLIC BLOOD PRESSURE: 109 MMHG | HEIGHT: 72 IN | BODY MASS INDEX: 26.83 KG/M2 | DIASTOLIC BLOOD PRESSURE: 67 MMHG | WEIGHT: 198.06 LBS

## 2020-11-03 DIAGNOSIS — F41.1 GENERALIZED ANXIETY DISORDER: Primary | ICD-10-CM

## 2020-11-03 PROCEDURE — 99999 PR PBB SHADOW E&M-EST. PATIENT-LVL IV: CPT | Mod: PBBFAC,,, | Performed by: PSYCHOLOGIST

## 2020-11-03 PROCEDURE — 99213 OFFICE O/P EST LOW 20 MIN: CPT | Mod: S$PBB,,, | Performed by: PSYCHOLOGIST

## 2020-11-03 PROCEDURE — 90833 PSYTX W PT W E/M 30 MIN: CPT | Mod: S$PBB,,, | Performed by: PSYCHOLOGIST

## 2020-11-03 PROCEDURE — 99999 PR PBB SHADOW E&M-EST. PATIENT-LVL IV: ICD-10-PCS | Mod: PBBFAC,,, | Performed by: PSYCHOLOGIST

## 2020-11-03 PROCEDURE — 90833 PR PSYCHOTHERAPY W/PATIENT W/E&M, 30 MIN (ADD ON): ICD-10-PCS | Mod: S$PBB,,, | Performed by: PSYCHOLOGIST

## 2020-11-03 PROCEDURE — 99214 OFFICE O/P EST MOD 30 MIN: CPT | Mod: PBBFAC,PO | Performed by: PSYCHOLOGIST

## 2020-11-03 PROCEDURE — 99213 PR OFFICE/OUTPT VISIT, EST, LEVL III, 20-29 MIN: ICD-10-PCS | Mod: S$PBB,,, | Performed by: PSYCHOLOGIST

## 2020-11-03 NOTE — PROGRESS NOTES
Outpatient Psychiatry Follow-Up Visit    Clinical Status of Patient: Outpatient (Ambulatory)  11/03/2020     Chief Complaint: 60 year old male presenting today for a follow-up.       Interval History and Content of Current Session:  Interim Events/Subjective Report/Content of Current Session:  follow-up appointment.    Pt is a 60 year old male with past psychiatric hx of generalized anxiety who presents for follow-up treatment. Pt titrated himself off of Buspar and said that he noted no increase in anxiety. Pt said that he believes Buspar didn't work so well. We discussed Klonopin alone not being a good long-term solution. We discussed Remeron and he does not want to try another medication related to anxiety. We discussed starting a cardio regimen and therapy. Pt disinterested in individual therapy but will start group therapy.    Past Psychiatric hx: Vybriid, Effexor, Prozac, Elavil, Zoloft, Celexa, Lexapro, Trintellix, Cymbalta, Concerta, Strattera, Wellbutrin    Past Medical hx:   Past Medical History:   Diagnosis Date    Anxiety     Coronary artery disease     Diabetes mellitus, type 2     Hypertension         Interim hx:  Medication changes last visit:  pt stopped Buspar  Anxiety: mild - unchanged  Depression: pt denied     Denies suicidal/homicidal ideations.  Denies hopelessness/worthlessness.    Denies auditory/visual hallucinations      Alcohol: minimal use  Drug: pt denied  Caffeine: minimal use  Tobacco: pt denied      Review of Systems   · PSYCHIATRIC: Pertinent items are noted in the narrative.        CONSTITUTIONAL: weight stable     Past Medical, Family and Social History: The patient's past medical, family and social history have been reviewed and updated as appropriate within the electronic medical record. See encounter notes.     Current Psychiatric Medication:  Klonopin 0.5-1mg Q HS PRN     Compliance: no - pt stopped Buspar on his own     Side effects: none     Risk Parameters:  Patient  reports no suicidal ideation  Patient reports no homicidal ideation  Patient reports no self-injurious behavior  Patient reports no violent behavior     Exam (detailed: at least 9 elements; comprehensive: all 15 elements)   Constitutional  Vitals:  Most recent vital signs, dated less than 90 days prior to this appointment, were reviewed. Pulse:  [55]   BP: (109)/(67)       General:  unremarkable, age appropriate, casual attire, good eye contact, good rapport       Musculoskeletal  Muscle Strength/Tone:  no flaccidity, no tremor    Gait & Station:  normal      Psychiatric                       Speech:  normal tone, normal rate, rhythm, and volume   Mood & Affect:   Depressed, anxious         Thought Process:   Goal directed; Linear    Associations:   intact   Thought Content:   No SI/HI, delusions, or paranoia, no AV/VH   Insight & Judgement:   Good, adequate to circumstances   Orientation:   grossly intact; alert and oriented x 4    Memory:  intact for content of interview    Language:  grossly intact, can repeat    Attention Span  : Grossly intact for content of interview   Fund of Knowledge:   intact and appropriate to age and level of education        Assessment and Diagnosis   Status/Progress: unchanged     Impression: Pt appears to struggle with generalized anxiety that causes high subjective distress but conventional treatments cause lethargy and low motivation. Pt is requesting to try another medication trial.    Diagnosis: Generalized Anxiety Disorder    Intervention/Counseling/Treatment Plan   · Medication Management:      1. Pt will Klonopin 0.5-1mg Q HS PRN sparingly prescribed by PCP    2. Consider Remeron 15mg Q HS trial    3. Pt added to group waitlist     4. Call to report any worsening of symptoms or problems with the medication. Pt instructed to go to ER with thoughts of harming self, others    Psychotherapy:   · Target symptoms: anxiety  · Why chosen therapy is appropriate versus another modality:  CBT used; relevant to diagnosis, patient responds to this modality  · Outcome monitoring methods: self-report, observation  · Therapeutic intervention type: Cognitive Behavioral Therapy  · Topics discussed/themes: building skills sets for symptom management, symptom recognition, nutrition, exercise  · The patient's response to the intervention is good  · Patient's response to treatment is: good.   · The patient's progress toward treatment goals: unchanged  · Duration of intervention: 20 minutes     Return to clinic: 6 months or earlier PRN    -Spent 20min face to face with the pt; >50% time spent in counseling   -Cognitive-Behavioral/Supportive therapy and psychoeducation provided  -R/B/SE's of medications discussed with the pt who expresses understanding and chooses to take medications as prescribed.   -Pt instructed to call clinic, 911 or go to nearest emergency room if sxs worsen or pt is in   crisis. The pt expresses understanding.    Jaret Wise, PhD, MP

## 2020-11-11 ENCOUNTER — TELEPHONE (OUTPATIENT)
Dept: PAIN MEDICINE | Facility: CLINIC | Age: 60
End: 2020-11-11

## 2020-11-11 NOTE — TELEPHONE ENCOUNTER
----- Message from Maisha Gutierrez sent at 11/11/2020 12:33 PM CST -----  Contact: self  Type: Needs Medical Advice  Who Called:  patient     Best Call Back Number: [phone] (home)     Additional Information: Dr. Dayton Brannon sent release today for patient procedure on 11/16/2020, please contact to confirm it was received

## 2020-11-12 ENCOUNTER — TELEPHONE (OUTPATIENT)
Dept: PAIN MEDICINE | Facility: CLINIC | Age: 60
End: 2020-11-12

## 2020-11-12 NOTE — TELEPHONE ENCOUNTER
----- Message from Donna Bonilla MA sent at 11/12/2020  8:45 AM CST -----  PT is requesting a call back in regards to his PROCS and will he have any restrictions  due to having another appt on same day as PROCS   Call back # [phone]

## 2020-11-13 ENCOUNTER — LAB VISIT (OUTPATIENT)
Dept: FAMILY MEDICINE | Facility: CLINIC | Age: 60
End: 2020-11-13
Payer: MEDICARE

## 2020-11-13 DIAGNOSIS — Z13.9 SCREENING PROCEDURE: ICD-10-CM

## 2020-11-13 PROCEDURE — U0003 INFECTIOUS AGENT DETECTION BY NUCLEIC ACID (DNA OR RNA); SEVERE ACUTE RESPIRATORY SYNDROME CORONAVIRUS 2 (SARS-COV-2) (CORONAVIRUS DISEASE [COVID-19]), AMPLIFIED PROBE TECHNIQUE, MAKING USE OF HIGH THROUGHPUT TECHNOLOGIES AS DESCRIBED BY CMS-2020-01-R: HCPCS

## 2020-11-14 LAB — SARS-COV-2 RNA RESP QL NAA+PROBE: NOT DETECTED

## 2020-11-16 ENCOUNTER — HOSPITAL ENCOUNTER (OUTPATIENT)
Facility: HOSPITAL | Age: 60
Discharge: HOME | End: 2020-11-16
Attending: ANESTHESIOLOGY | Admitting: ANESTHESIOLOGY
Payer: MEDICARE

## 2020-11-16 ENCOUNTER — HOSPITAL ENCOUNTER (OUTPATIENT)
Dept: RADIOLOGY | Facility: HOSPITAL | Age: 60
Discharge: HOME | End: 2020-11-16
Attending: ANESTHESIOLOGY
Payer: MEDICARE

## 2020-11-16 VITALS
HEART RATE: 69 BPM | DIASTOLIC BLOOD PRESSURE: 68 MMHG | HEIGHT: 72 IN | WEIGHT: 198 LBS | BODY MASS INDEX: 26.82 KG/M2 | TEMPERATURE: 98 F | OXYGEN SATURATION: 98 % | RESPIRATION RATE: 16 BRPM | SYSTOLIC BLOOD PRESSURE: 113 MMHG

## 2020-11-16 DIAGNOSIS — M54.12 CERVICAL RADICULOPATHY: ICD-10-CM

## 2020-11-16 DIAGNOSIS — M54.12 CERVICAL RADICULOPATHY: Primary | ICD-10-CM

## 2020-11-16 LAB — GLUCOSE SERPL-MCNC: 74 MG/DL (ref 70–110)

## 2020-11-16 PROCEDURE — 62321 NJX INTERLAMINAR CRV/THRC: CPT | Mod: ,,, | Performed by: ANESTHESIOLOGY

## 2020-11-16 PROCEDURE — 62321 PR INJ CERV/THORAC, W/GUIDANCE: ICD-10-PCS | Mod: ,,, | Performed by: ANESTHESIOLOGY

## 2020-11-16 PROCEDURE — 99152 PR MOD CONSCIOUS SEDATION, SAME PHYS, 5+ YRS, FIRST 15 MIN: ICD-10-PCS | Mod: ,,, | Performed by: ANESTHESIOLOGY

## 2020-11-16 PROCEDURE — 25500020 PHARM REV CODE 255: Mod: PO | Performed by: ANESTHESIOLOGY

## 2020-11-16 PROCEDURE — 25000003 PHARM REV CODE 250: Mod: PO | Performed by: ANESTHESIOLOGY

## 2020-11-16 PROCEDURE — 82962 GLUCOSE BLOOD TEST: CPT | Mod: PO | Performed by: ANESTHESIOLOGY

## 2020-11-16 PROCEDURE — 76000 FLUOROSCOPY <1 HR PHYS/QHP: CPT | Mod: TC,PO

## 2020-11-16 PROCEDURE — 99152 MOD SED SAME PHYS/QHP 5/>YRS: CPT | Mod: ,,, | Performed by: ANESTHESIOLOGY

## 2020-11-16 PROCEDURE — 62321 NJX INTERLAMINAR CRV/THRC: CPT | Mod: PO | Performed by: ANESTHESIOLOGY

## 2020-11-16 PROCEDURE — A9579 GAD-BASE MR CONTRAST NOS,1ML: HCPCS | Mod: PO | Performed by: ANESTHESIOLOGY

## 2020-11-16 PROCEDURE — 63600175 PHARM REV CODE 636 W HCPCS: Mod: PO | Performed by: ANESTHESIOLOGY

## 2020-11-16 RX ORDER — MIDAZOLAM HYDROCHLORIDE 2 MG/2ML
INJECTION, SOLUTION INTRAMUSCULAR; INTRAVENOUS
Status: DISCONTINUED | OUTPATIENT
Start: 2020-11-16 | End: 2020-11-16 | Stop reason: HOSPADM

## 2020-11-16 RX ORDER — SODIUM CHLORIDE, SODIUM LACTATE, POTASSIUM CHLORIDE, CALCIUM CHLORIDE 600; 310; 30; 20 MG/100ML; MG/100ML; MG/100ML; MG/100ML
INJECTION, SOLUTION INTRAVENOUS CONTINUOUS
Status: DISCONTINUED | OUTPATIENT
Start: 2020-11-16 | End: 2020-11-16 | Stop reason: HOSPADM

## 2020-11-16 RX ORDER — LIDOCAINE HYDROCHLORIDE 10 MG/ML
INJECTION, SOLUTION EPIDURAL; INFILTRATION; INTRACAUDAL; PERINEURAL
Status: DISCONTINUED | OUTPATIENT
Start: 2020-11-16 | End: 2020-11-16 | Stop reason: HOSPADM

## 2020-11-16 RX ORDER — METHYLPREDNISOLONE ACETATE 80 MG/ML
INJECTION, SUSPENSION INTRA-ARTICULAR; INTRALESIONAL; INTRAMUSCULAR; SOFT TISSUE
Status: DISCONTINUED | OUTPATIENT
Start: 2020-11-16 | End: 2020-11-16 | Stop reason: HOSPADM

## 2020-11-16 RX ORDER — FAMOTIDINE 10 MG/1
10 TABLET ORAL DAILY
COMMUNITY

## 2020-11-16 RX ADMIN — SODIUM CHLORIDE, SODIUM LACTATE, POTASSIUM CHLORIDE, AND CALCIUM CHLORIDE: .6; .31; .03; .02 INJECTION, SOLUTION INTRAVENOUS at 01:11

## 2020-11-16 NOTE — DISCHARGE SUMMARY
[facility]  Discharge Note  Short Stay    Procedure(s) (LRB):  Injection-steroid-epidural-cervical to left (N/A)    OUTCOME: Patient tolerated treatment/procedure well without complication and is now ready for discharge.    DISPOSITION: Home or Self Care    FINAL DIAGNOSIS:  Cervical radiculopathy    FOLLOWUP: In clinic    DISCHARGE INSTRUCTIONS:    Discharge Procedure Orders   No dressing needed

## 2020-11-16 NOTE — DISCHARGE INSTRUCTIONS
PAIN MANAGEMENT    Home care instructions   Apply ice pack to the injection site for 20 minute prior for the first 24 hours for soreness/discomfort at injection site   DO NOT USE HEAT FOR 24 HOURS   Keep site clean and dry for 24 hours, remove bandaid when desired   Do not drive until tomorrow  Take care when walking after a lumbar injection     STEROIDS OR RADIOFREQUENCY    May take 10-14 days for full effects  Avoid strenuous exercises for 2 days        Resume Aspirin, Plavix, or Coumadin the day after the procedure unless other wise instructed  Resume home medication as prescribed today      CALL PHYSICIAN FOR:   Severe increase in your usual pain or appearance of new pain   Prolonged or increasing weakness or numbness in the legs or arms   Fever greater then 100 degrees F..   Drainage from the incision site, redness, active bleeding or increased swelling at the injection site   Headache that increases when your head is upright and decreases when you lie flat    FOR EMERGENCIES:   Go directly to Emergency Department for Shortness of breath, chest pain, or problems breathing

## 2020-11-16 NOTE — INTERVAL H&P NOTE
The patient has been examined and the H&P has been reviewed:    I concur with the findings and no changes have occurred since H&P was written.    Anesthesia/Surgery risks, benefits and alternative options discussed and understood by patient/family.      ASA 2, mallampati 2    There are no hospital problems to display for this patient.

## 2020-11-16 NOTE — OP NOTE
PROCEDURE DATE: 11/16/2020    Procedure: C7-T1 cervical interlaminar epidural steroid injection under utilizing fluoroscopy.    Diagnosis: Cervical Radiculopathy    POSTOP DIAGNOSIS: SAME    Physician: Felipe Mathews MD    Medications injected:  Methylprednisone 80mg followed by a slow injection of 4 mL sterile, preservative-free normal saline.    Local anesthetic used: Lidocaine 1%, 4 ml.    Sedation Medications: 2mg versed    Complications:  none    Estimated blood loss: none    Technique:  A time-out was taken to identify patient and procedure prior to starting the procedure.  With the patient laying in a prone position with the neck in a mid-flexed forward position, the area was prepped and draped in the usual sterile fashion using ChloraPrep and a fenestrated drape.  The area was determined under AP fluoroscopic guidance.  Local anesthetic was given using a 25-gauge 1.5 inch needle by raising a wheal and then infiltrating ventrally.  A 3.5 inch 20-gauge Touhy needle was introduced under fluoroscopic guidance to meet the lamina of C7.  The needle was then hinged under the lamina then advanced using loss of resistance technique.  Once the tip of the needle was in the desired position, the contrast dye Omnipaque was injected to determine placement and no uptake.  The steroid was then injected slowly followed by a slow injection of 4 mL of the sterile preservative-free normal saline.  The patient tolerated the procedure well.    The patient was monitored after the procedure and was given post-procedure and discharge instructions to follow at home. The patient was discharged in a stable condition.    Event Time In   In Facility 1243   In Pre-Procedure 1310   Physician Available    Anesthesia Available    Pre-Op: Bedside Procedure Start    Pre-Op: Bedside Procedure Stop    Pre-Procedure Complete 1344   Out of Pre-Procedure    Anesthesia Start    Anesthesia Start Data Collection    Setup Start    Setup Complete     In Room 1353   Prep Start    Procedure Prep Complete    Procedure Start 1359   Procedure Closing    Emergence    Procedure Finish 1401   Sedation Start 1350   Scope In    Extent Reached    Scope Out 1404   Sedation End 1404   Out of Room    Cleanup Start    Cleanup Complete    Cosmetic Start    Cosmetic Stop    Pain Mgmt In Room    Pain Mgmt Out Room    In Recovery    Anesthesia Finish    Bedside Procedure Start    Bedside Procedure Stop    Recovery Care Complete    Out of Recovery    To Phase II    In Phase II    Pain Mgmt Recovery Start    Pain Mgmt Recovery Stop    Obs Rec Start    Obs Rec Stop    Phase II Care Complete    Out of Phase II    Procedural Care Complete    Discharge    Pain Follow Up Needed    Pain Follow Up Complete      Moderate sedation was achieved with midazolam 2mg.  Continuous monitoring of EKG, blood pressure and pulse oximetry was provided by a registered nurse during the entire course of the procedure under my supervision and recorded in the patient's medical record.   Total time for sedation was 14 minutes.

## 2020-12-07 ENCOUNTER — OFFICE VISIT (OUTPATIENT)
Dept: PAIN MEDICINE | Facility: CLINIC | Age: 60
End: 2020-12-07
Payer: MEDICARE

## 2020-12-07 ENCOUNTER — TELEPHONE (OUTPATIENT)
Dept: PAIN MEDICINE | Facility: CLINIC | Age: 60
End: 2020-12-07

## 2020-12-07 VITALS
TEMPERATURE: 98 F | RESPIRATION RATE: 18 BRPM | DIASTOLIC BLOOD PRESSURE: 60 MMHG | HEART RATE: 68 BPM | BODY MASS INDEX: 26.7 KG/M2 | OXYGEN SATURATION: 97 % | SYSTOLIC BLOOD PRESSURE: 101 MMHG | WEIGHT: 196.88 LBS

## 2020-12-07 DIAGNOSIS — M54.12 CERVICAL RADICULOPATHY: Primary | ICD-10-CM

## 2020-12-07 DIAGNOSIS — M50.30 DDD (DEGENERATIVE DISC DISEASE), CERVICAL: ICD-10-CM

## 2020-12-07 DIAGNOSIS — Z13.9 SCREENING PROCEDURE: ICD-10-CM

## 2020-12-07 PROCEDURE — 99215 OFFICE O/P EST HI 40 MIN: CPT | Mod: PBBFAC,PN | Performed by: PHYSICIAN ASSISTANT

## 2020-12-07 PROCEDURE — 99999 PR PBB SHADOW E&M-EST. PATIENT-LVL V: CPT | Mod: PBBFAC,,, | Performed by: PHYSICIAN ASSISTANT

## 2020-12-07 PROCEDURE — 99214 OFFICE O/P EST MOD 30 MIN: CPT | Mod: S$PBB,,, | Performed by: PHYSICIAN ASSISTANT

## 2020-12-07 PROCEDURE — 99999 PR PBB SHADOW E&M-EST. PATIENT-LVL V: ICD-10-PCS | Mod: PBBFAC,,, | Performed by: PHYSICIAN ASSISTANT

## 2020-12-07 PROCEDURE — 99214 PR OFFICE/OUTPT VISIT, EST, LEVL IV, 30-39 MIN: ICD-10-PCS | Mod: S$PBB,,, | Performed by: PHYSICIAN ASSISTANT

## 2020-12-07 RX ORDER — SODIUM CHLORIDE, SODIUM LACTATE, POTASSIUM CHLORIDE, CALCIUM CHLORIDE 600; 310; 30; 20 MG/100ML; MG/100ML; MG/100ML; MG/100ML
INJECTION, SOLUTION INTRAVENOUS CONTINUOUS
Status: CANCELLED | OUTPATIENT
Start: 2020-12-17

## 2020-12-07 NOTE — TELEPHONE ENCOUNTER
----- Message from Cherelle Christopher, Patient Care Assistant sent at 12/7/2020 10:12 AM CST -----  Name of Who is Calling: CHRISTA ALMENDAREZ [[id number]]    What is the request in detail: Requesting a call back in regards of scheduling a injection. Please contact to further discuss and advise      Can the clinic reply by [facility]: No    [facility]   [phone]

## 2020-12-09 ENCOUNTER — TELEPHONE (OUTPATIENT)
Dept: PAIN MEDICINE | Facility: CLINIC | Age: 60
End: 2020-12-09

## 2020-12-09 NOTE — TELEPHONE ENCOUNTER
----- Message from Trina Zepeda sent at 12/9/2020  8:58 AM CST -----  Contact: patient  Type: Needs Medical Advice  Who Called:  patient  Symptoms (please be specific):  na  How long has patient had these symptoms:  jeanne  Pharmacy name and phone #:  jeanne  Best Call Back Number: [phone]  Additional Information: Patient states he thinks he has procedure on 12/17/20 but no covid test has been scheduled, however there are orders in system.  Patient states he would like to speak to the nurse about Covid test and thinks she might have forgotten to schedule.  Please call to advise and schedule.  Thanks!

## 2020-12-14 ENCOUNTER — LAB VISIT (OUTPATIENT)
Dept: FAMILY MEDICINE | Facility: CLINIC | Age: 60
End: 2020-12-14
Payer: MEDICARE

## 2020-12-14 DIAGNOSIS — Z13.9 SCREENING PROCEDURE: ICD-10-CM

## 2020-12-14 PROCEDURE — U0003 INFECTIOUS AGENT DETECTION BY NUCLEIC ACID (DNA OR RNA); SEVERE ACUTE RESPIRATORY SYNDROME CORONAVIRUS 2 (SARS-COV-2) (CORONAVIRUS DISEASE [COVID-19]), AMPLIFIED PROBE TECHNIQUE, MAKING USE OF HIGH THROUGHPUT TECHNOLOGIES AS DESCRIBED BY CMS-2020-01-R: HCPCS

## 2020-12-14 NOTE — H&P (VIEW-ONLY)
This note was completed with dictation software and grammatical errors may exist.    CC:Neck and arm pain    HPI:  The patient is a 60-year-old man with a history of CAD, CABG, diabetes who presents in referral from Dr. Boss for neck pain and left shoulder pain.  He is status post C7-T1 interlaminar epidural steroid injection on 11/16/2020 with minimal relief.  The patient is new to me.  He complains of pain starting in left shoulder radiating into his left arm, and left 1st 3 digits.  He describes the pain is numb, uncomfortable, intermittent and random.  He also reports diabetic neuropathy in his feet.  He denies weakness or incontinence.  Reports that his last hemoglobin A1c was about 6.8    Pain intervention history:  He has a history of an injection for his lumbar spine which help a great deal.  He is status post C7-T1 interlaminar epidural steroid injection on 11/16/2020 with minimal relief.     Antineuropathics:  NSAIDs:  Physical therapy:  Has done physical therapy with improvement in his neck and left arm pain  Antidepressants:  Muscle relaxers:  Klonopin  Opioids:  Antiplatelets/Anticoagulants:  ASA 81      ROS:  He reports joint stiffness and anxiety.  Balance of review of systems is negative.    No results found for: LABA1C, HGBA1C    Lab Results   Component Value Date    WBC 5.23 12/10/2019    HGB 14.9 12/10/2019    HCT 43.9 12/10/2019    MCV 86 12/10/2019     12/10/2019             Past Medical History:   Diagnosis Date    Anxiety     Coronary artery disease     Diabetes mellitus, type 2     Hypertension        Past Surgical History:   Procedure Laterality Date    CARDIAC SURGERY      CORONARY ARTERY BYPASS GRAFT      EPIDURAL STEROID INJECTION INTO CERVICAL SPINE N/A 11/16/2020    Procedure: Injection-steroid-epidural-cervical to left;  Surgeon: Felipe Mathews MD;  Location: [facility] OR;  Service: Pain Management;  Laterality: N/A;       Social History     Socioeconomic History     Marital status: Single     Spouse name: Not on file    Number of children: Not on file    Years of education: Not on file    Highest education level: Not on file   Occupational History    Not on file   Social Needs    Financial resource strain: Not on file    Food insecurity     Worry: Not on file     Inability: Not on file    Transportation needs     Medical: Not on file     Non-medical: Not on file   Tobacco Use    Smoking status: Never Smoker    Smokeless tobacco: Never Used   Substance and Sexual Activity    Alcohol use: Yes     Alcohol/week: 3.0 standard drinks     Types: 3 Cans of beer per week     Comment: only on weekends    Drug use: Not Currently    Sexual activity: Not on file   Lifestyle    Physical activity     Days per week: Not on file     Minutes per session: Not on file    Stress: Not on file   Relationships    Social connections     Talks on phone: Not on file     Gets together: Not on file     Attends Sabianism service: Not on file     Active member of club or organization: Not on file     Attends meetings of clubs or organizations: Not on file     Relationship status: Not on file   Other Topics Concern    Not on file   Social History Narrative    Not on file         Medications/Allergies: See med card    Vitals:    12/07/20 0856   BP: 101/60   Pulse: 68   Resp: 18   Temp: 97.8 °F (36.6 °C)   TempSrc: Temporal   SpO2: 97%   Weight: 89.3 kg (196 lb 13.9 oz)   PainSc:   4   PainLoc: Arm         Physical exam:  Gen: A and O x3, pleasant, well-groomed  Skin: No rashes or obvious lesions  HEENT: PERRLA, no obvious deformities on ears or in canals.Trachea midline.  CVS: Regular rate and rhythm, normal palpable pulses.  Resp: Clear to auscultation bilaterally, no wheezes or rales.  Abdomen: Soft, NT/ND.  Musculoskeletal: Able to heel walk, toe walk. No antalgic gait.     Neuro:  Upper extremities: 5/5 strength bilaterally   Reflexes: Brachioradialis 2+, Bicep 2+, Tricep 2+.   Sensory:  Intact and symmetrical to light touch and pinprick in C2-T1 dermatomes bilaterally, except for some decreased sensation to light touch over the 1st through 3rd fingers.    Cervical Spine:  Cervical spine: ROM is full in flexion, extension and lateral rotation without increased pain except for left lateral rotation and flexion causing left shoulder and arm pain   Spurling's maneuver causes left arm pain.  Myofascial exam: No Tenderness to palpation across cervical paraspinous region bilaterally.    Imagin20 MRI C-spine:   There is degenerative disc desiccation at virtually every cervical level with disc space narrowing and marginal osteophyte formation observed at C5-C6..   The cervical spinal cord is normal in signal intensity without evidence of cord edema, myelomalacia, or cord syrinx.  No epidural fluid collections or masses.   The incidentally observed soft tissues of the neck show no significant abnormalities.   C2-C3: There is left ligamentum flavum thickening.  There is bilateral facet arthropathy, left greater than right.  No disc protrusion or extrusion. No central canal stenosis or neuroforaminal stenosis.   C3-C4: There is bilateral hypertrophic facet arthropathy.  There is minimal left uncovertebral spurring.  There is, at most, mild bilateral neuroforaminal stenosis.  There is effacement of the anterior CSF sleeve.  No central canal stenosis.   C4-C5: There is bilateral uncovertebral spurring, left greater than right, and there is prominent left facet arthropathy.  There is severe left neuroforaminal stenosis.  Please correlate clinically for symptoms referable to the left C5 nerve.  No right neuroforaminal stenosis.  There is effacement of the anterior CSF sleeve.  No overall central canal stenosis.   C5-C6: There is prominent bilateral uncovertebral spurring and bilateral facet arthropathy, left greater than right.  There is a broad central disc protrusion superimposed upon a posterior disc  osteophyte complex.  There is moderate central canal stenosis and flattening of the ventral cord.  There is ligamentum flavum thickening.  There is moderate right and severe left neuroforaminal stenosis.  Please correlate clinically for symptoms referable to the C6 nerves, left greater than right.   C6-C7: There is ligamentum flavum thickening and bilateral uncovertebral spurring, right greater than left.  No left neuroforaminal stenosis.  There is, at most, mild right neuroforaminal stenosis.  There is mild overall central canal stenosis.  C7-T1: There is bilateral facet arthropathy and ligamentum flavum thickening.  There is right uncovertebral spurring.  No central canal or neuroforaminal stenosis.    7/11/20 MRI Left shoulder:  A small fragment of shrapnel in the superficial aspect of the lateral deltoid muscle causes metallic artifact.  There is degenerative arthrosis of the glenohumeral joint with joint space narrowing, articular cartilage thinning, and degenerative fraying of the glenoid labrum.  A very small joint effusion is also present.  There is degenerative tendinosis of the supraspinatus tendon without evidence of significant tear.  Other rotator cuff tendons appear normal.  The acromioclavicular joint is relatively well maintained.  Visualized ligaments appear intact.  Bone marrow signal appears normal.    Assessment:   The patient is a 60-year-old man with a history of CAD, CABG, diabetes who presents in referral from Dr. Boss for neck pain and left shoulder pain.   1. Cervical radiculopathy  Vital signs    Place 18-22 gauage peripheral IV     Verify informed consent    Notify physician     Notify physician     Notify physician (specify)    Diet NPO    Case Request Operating Room: Injection,steroid,epidural,transforaminal approach    Place in Outpatient    lactated ringers infusion   2. DDD (degenerative disc disease), cervical     3. Screening procedure  COVID-19 Routine Screening         Plan:  1.   Since the patient did not have sufficient relief following the interlaminar injection, I will schedule him for a left C5/6 transforaminal epidural steroid injection, more specific to the level likely causing his symptoms.  We discussed that he is a high risk patient with coronary artery disease.  We will have to hold aspirin prior to his procedure.  2.  Follow-up in 4 weeks postprocedure or sooner as needed.

## 2020-12-14 NOTE — PROGRESS NOTES
This note was completed with dictation software and grammatical errors may exist.    CC:Neck and arm pain    HPI:  The patient is a 60-year-old man with a history of CAD, CABG, diabetes who presents in referral from Dr. Boss for neck pain and left shoulder pain.  He is status post C7-T1 interlaminar epidural steroid injection on 11/16/2020 with minimal relief.  The patient is new to me.  He complains of pain starting in left shoulder radiating into his left arm, and left 1st 3 digits.  He describes the pain is numb, uncomfortable, intermittent and random.  He also reports diabetic neuropathy in his feet.  He denies weakness or incontinence.  Reports that his last hemoglobin A1c was about 6.8    Pain intervention history:  He has a history of an injection for his lumbar spine which help a great deal.  He is status post C7-T1 interlaminar epidural steroid injection on 11/16/2020 with minimal relief.     Antineuropathics:  NSAIDs:  Physical therapy:  Has done physical therapy with improvement in his neck and left arm pain  Antidepressants:  Muscle relaxers:  Klonopin  Opioids:  Antiplatelets/Anticoagulants:  ASA 81      ROS:  He reports joint stiffness and anxiety.  Balance of review of systems is negative.    No results found for: LABA1C, HGBA1C    Lab Results   Component Value Date    WBC 5.23 12/10/2019    HGB 14.9 12/10/2019    HCT 43.9 12/10/2019    MCV 86 12/10/2019     12/10/2019             Past Medical History:   Diagnosis Date    Anxiety     Coronary artery disease     Diabetes mellitus, type 2     Hypertension        Past Surgical History:   Procedure Laterality Date    CARDIAC SURGERY      CORONARY ARTERY BYPASS GRAFT      EPIDURAL STEROID INJECTION INTO CERVICAL SPINE N/A 11/16/2020    Procedure: Injection-steroid-epidural-cervical to left;  Surgeon: Felipe Mathews MD;  Location: [facility] OR;  Service: Pain Management;  Laterality: N/A;       Social History     Socioeconomic History     Marital status: Single     Spouse name: Not on file    Number of children: Not on file    Years of education: Not on file    Highest education level: Not on file   Occupational History    Not on file   Social Needs    Financial resource strain: Not on file    Food insecurity     Worry: Not on file     Inability: Not on file    Transportation needs     Medical: Not on file     Non-medical: Not on file   Tobacco Use    Smoking status: Never Smoker    Smokeless tobacco: Never Used   Substance and Sexual Activity    Alcohol use: Yes     Alcohol/week: 3.0 standard drinks     Types: 3 Cans of beer per week     Comment: only on weekends    Drug use: Not Currently    Sexual activity: Not on file   Lifestyle    Physical activity     Days per week: Not on file     Minutes per session: Not on file    Stress: Not on file   Relationships    Social connections     Talks on phone: Not on file     Gets together: Not on file     Attends Taoist service: Not on file     Active member of club or organization: Not on file     Attends meetings of clubs or organizations: Not on file     Relationship status: Not on file   Other Topics Concern    Not on file   Social History Narrative    Not on file         Medications/Allergies: See med card    Vitals:    12/07/20 0856   BP: 101/60   Pulse: 68   Resp: 18   Temp: 97.8 °F (36.6 °C)   TempSrc: Temporal   SpO2: 97%   Weight: 89.3 kg (196 lb 13.9 oz)   PainSc:   4   PainLoc: Arm         Physical exam:  Gen: A and O x3, pleasant, well-groomed  Skin: No rashes or obvious lesions  HEENT: PERRLA, no obvious deformities on ears or in canals.Trachea midline.  CVS: Regular rate and rhythm, normal palpable pulses.  Resp: Clear to auscultation bilaterally, no wheezes or rales.  Abdomen: Soft, NT/ND.  Musculoskeletal: Able to heel walk, toe walk. No antalgic gait.     Neuro:  Upper extremities: 5/5 strength bilaterally   Reflexes: Brachioradialis 2+, Bicep 2+, Tricep 2+.   Sensory:  Intact and symmetrical to light touch and pinprick in C2-T1 dermatomes bilaterally, except for some decreased sensation to light touch over the 1st through 3rd fingers.    Cervical Spine:  Cervical spine: ROM is full in flexion, extension and lateral rotation without increased pain except for left lateral rotation and flexion causing left shoulder and arm pain   Spurling's maneuver causes left arm pain.  Myofascial exam: No Tenderness to palpation across cervical paraspinous region bilaterally.    Imagin20 MRI C-spine:   There is degenerative disc desiccation at virtually every cervical level with disc space narrowing and marginal osteophyte formation observed at C5-C6..   The cervical spinal cord is normal in signal intensity without evidence of cord edema, myelomalacia, or cord syrinx.  No epidural fluid collections or masses.   The incidentally observed soft tissues of the neck show no significant abnormalities.   C2-C3: There is left ligamentum flavum thickening.  There is bilateral facet arthropathy, left greater than right.  No disc protrusion or extrusion. No central canal stenosis or neuroforaminal stenosis.   C3-C4: There is bilateral hypertrophic facet arthropathy.  There is minimal left uncovertebral spurring.  There is, at most, mild bilateral neuroforaminal stenosis.  There is effacement of the anterior CSF sleeve.  No central canal stenosis.   C4-C5: There is bilateral uncovertebral spurring, left greater than right, and there is prominent left facet arthropathy.  There is severe left neuroforaminal stenosis.  Please correlate clinically for symptoms referable to the left C5 nerve.  No right neuroforaminal stenosis.  There is effacement of the anterior CSF sleeve.  No overall central canal stenosis.   C5-C6: There is prominent bilateral uncovertebral spurring and bilateral facet arthropathy, left greater than right.  There is a broad central disc protrusion superimposed upon a posterior disc  osteophyte complex.  There is moderate central canal stenosis and flattening of the ventral cord.  There is ligamentum flavum thickening.  There is moderate right and severe left neuroforaminal stenosis.  Please correlate clinically for symptoms referable to the C6 nerves, left greater than right.   C6-C7: There is ligamentum flavum thickening and bilateral uncovertebral spurring, right greater than left.  No left neuroforaminal stenosis.  There is, at most, mild right neuroforaminal stenosis.  There is mild overall central canal stenosis.  C7-T1: There is bilateral facet arthropathy and ligamentum flavum thickening.  There is right uncovertebral spurring.  No central canal or neuroforaminal stenosis.    7/11/20 MRI Left shoulder:  A small fragment of shrapnel in the superficial aspect of the lateral deltoid muscle causes metallic artifact.  There is degenerative arthrosis of the glenohumeral joint with joint space narrowing, articular cartilage thinning, and degenerative fraying of the glenoid labrum.  A very small joint effusion is also present.  There is degenerative tendinosis of the supraspinatus tendon without evidence of significant tear.  Other rotator cuff tendons appear normal.  The acromioclavicular joint is relatively well maintained.  Visualized ligaments appear intact.  Bone marrow signal appears normal.    Assessment:   The patient is a 60-year-old man with a history of CAD, CABG, diabetes who presents in referral from Dr. Boss for neck pain and left shoulder pain.   1. Cervical radiculopathy  Vital signs    Place 18-22 gauage peripheral IV     Verify informed consent    Notify physician     Notify physician     Notify physician (specify)    Diet NPO    Case Request Operating Room: Injection,steroid,epidural,transforaminal approach    Place in Outpatient    lactated ringers infusion   2. DDD (degenerative disc disease), cervical     3. Screening procedure  COVID-19 Routine Screening         Plan:  1.   Since the patient did not have sufficient relief following the interlaminar injection, I will schedule him for a left C5/6 transforaminal epidural steroid injection, more specific to the level likely causing his symptoms.  We discussed that he is a high risk patient with coronary artery disease.  We will have to hold aspirin prior to his procedure.  2.  Follow-up in 4 weeks postprocedure or sooner as needed.

## 2020-12-15 ENCOUNTER — PATIENT MESSAGE (OUTPATIENT)
Dept: PAIN MEDICINE | Facility: CLINIC | Age: 60
End: 2020-12-15

## 2020-12-15 LAB — SARS-COV-2 RNA RESP QL NAA+PROBE: NOT DETECTED

## 2020-12-17 ENCOUNTER — HOSPITAL ENCOUNTER (OUTPATIENT)
Facility: HOSPITAL | Age: 60
Discharge: HOME | End: 2020-12-17
Attending: ANESTHESIOLOGY | Admitting: ANESTHESIOLOGY
Payer: MEDICARE

## 2020-12-17 ENCOUNTER — HOSPITAL ENCOUNTER (OUTPATIENT)
Dept: RADIOLOGY | Facility: HOSPITAL | Age: 60
Discharge: HOME | End: 2020-12-17
Attending: ANESTHESIOLOGY
Payer: MEDICARE

## 2020-12-17 DIAGNOSIS — M54.12 CERVICAL RADICULOPATHY: ICD-10-CM

## 2020-12-17 DIAGNOSIS — M54.12 CERVICAL RADICULOPATHY: Primary | ICD-10-CM

## 2020-12-17 LAB — GLUCOSE SERPL-MCNC: 90 MG/DL (ref 70–110)

## 2020-12-17 PROCEDURE — 63600175 PHARM REV CODE 636 W HCPCS: Mod: PO | Performed by: ANESTHESIOLOGY

## 2020-12-17 PROCEDURE — 64479 NJX AA&/STRD TFRM EPI C/T 1: CPT | Mod: PO | Performed by: ANESTHESIOLOGY

## 2020-12-17 PROCEDURE — 25500020 PHARM REV CODE 255: Mod: PO | Performed by: ANESTHESIOLOGY

## 2020-12-17 PROCEDURE — A9579 GAD-BASE MR CONTRAST NOS,1ML: HCPCS | Mod: PO | Performed by: ANESTHESIOLOGY

## 2020-12-17 PROCEDURE — 64479 NJX AA&/STRD TFRM EPI C/T 1: CPT | Mod: LT,,, | Performed by: ANESTHESIOLOGY

## 2020-12-17 PROCEDURE — 63600175 PHARM REV CODE 636 W HCPCS: Mod: PO | Performed by: PHYSICIAN ASSISTANT

## 2020-12-17 PROCEDURE — 64479 PR INJECT ANES/STEROID FORAMEN CERV/THORACIC W IMG GUIDE ,1 LEVEL: ICD-10-PCS | Mod: LT,,, | Performed by: ANESTHESIOLOGY

## 2020-12-17 PROCEDURE — 25000003 PHARM REV CODE 250: Mod: PO | Performed by: ANESTHESIOLOGY

## 2020-12-17 PROCEDURE — 76000 FLUOROSCOPY <1 HR PHYS/QHP: CPT | Mod: TC,PO

## 2020-12-17 RX ORDER — LIDOCAINE HYDROCHLORIDE 10 MG/ML
INJECTION, SOLUTION EPIDURAL; INFILTRATION; INTRACAUDAL; PERINEURAL
Status: DISCONTINUED | OUTPATIENT
Start: 2020-12-17 | End: 2020-12-17 | Stop reason: HOSPADM

## 2020-12-17 RX ORDER — LIDOCAINE HYDROCHLORIDE AND EPINEPHRINE 10; 10 MG/ML; UG/ML
INJECTION, SOLUTION INFILTRATION; PERINEURAL
Status: DISCONTINUED | OUTPATIENT
Start: 2020-12-17 | End: 2020-12-17 | Stop reason: HOSPADM

## 2020-12-17 RX ORDER — FENTANYL CITRATE 50 UG/ML
INJECTION, SOLUTION INTRAMUSCULAR; INTRAVENOUS
Status: DISCONTINUED | OUTPATIENT
Start: 2020-12-17 | End: 2020-12-17 | Stop reason: HOSPADM

## 2020-12-17 RX ORDER — LIDOCAINE HYDROCHLORIDE 20 MG/ML
INJECTION, SOLUTION EPIDURAL; INFILTRATION; INTRACAUDAL; PERINEURAL
Status: DISCONTINUED | OUTPATIENT
Start: 2020-12-17 | End: 2020-12-17 | Stop reason: HOSPADM

## 2020-12-17 RX ORDER — DEXAMETHASONE SODIUM PHOSPHATE 10 MG/ML
INJECTION INTRAMUSCULAR; INTRAVENOUS
Status: DISCONTINUED | OUTPATIENT
Start: 2020-12-17 | End: 2020-12-17 | Stop reason: HOSPADM

## 2020-12-17 RX ORDER — SODIUM CHLORIDE, SODIUM LACTATE, POTASSIUM CHLORIDE, CALCIUM CHLORIDE 600; 310; 30; 20 MG/100ML; MG/100ML; MG/100ML; MG/100ML
INJECTION, SOLUTION INTRAVENOUS CONTINUOUS
Status: DISCONTINUED | OUTPATIENT
Start: 2020-12-17 | End: 2020-12-17 | Stop reason: HOSPADM

## 2020-12-17 RX ORDER — MIDAZOLAM HYDROCHLORIDE 1 MG/ML
INJECTION INTRAMUSCULAR; INTRAVENOUS
Status: DISCONTINUED | OUTPATIENT
Start: 2020-12-17 | End: 2020-12-17 | Stop reason: HOSPADM

## 2020-12-17 RX ADMIN — SODIUM CHLORIDE, SODIUM LACTATE, POTASSIUM CHLORIDE, AND CALCIUM CHLORIDE: .6; .31; .03; .02 INJECTION, SOLUTION INTRAVENOUS at 01:12

## 2020-12-17 NOTE — DISCHARGE SUMMARY
[facility]  Discharge Note  Short Stay    Procedure(s) (LRB):  Injection,steroid,epidural,transforaminal approach C5,C6 (Left)    OUTCOME: Patient tolerated treatment/procedure well without complication and is now ready for discharge.    DISPOSITION: Home or Self Care    FINAL DIAGNOSIS:  Cervical radiculopathy    FOLLOWUP: In clinic    DISCHARGE INSTRUCTIONS:    Discharge Procedure Orders   Diet Adult Regular     Notify your health care provider if you experience any of the following:  temperature >100.4     No dressing needed     Activity as tolerated

## 2020-12-17 NOTE — OP NOTE
PROCEDURE DATE: 12/17/2020    PROCEDURE: Left C5/6 transforaminal epidural steroid injection under fluoroscopy    DIAGNOSIS: Cervical radiculopathy  Post op diagnosis: Same    PHYSICIAN: Felipe Mathews MD    MEDICATIONS INJECTED:  Methylprednisolone 40mg (0.5ml) and 1.5ml 0.25% bupivicaine at each nerve root.     LOCAL ANESTHETIC INJECTED:  Lidocaine 1%. 4 ml per site.    SEDATION MEDICATIONS: 4mg versed, 25mcg fentanyl    ESTIMATED BLOOD LOSS:  none    COMPLICATIONS:  none    TECHNIQUE:   A time-out was taken to identify patient and procedure side prior to starting the procedure. The patient was placed in a supine position, prepped and draped in the usual sterile fashion using ChloraPrep and sterile towels.  The area to be injected was determined under fluoroscopic guidance in AP and then oblique view, tilted to the side of interest.  Local anesthetic was given by raising a wheal and going down to the hub of a 25-gauge 1.5 inch needle.  In oblique view, a 3.5 inch 25-gauge bent-tip spinal needle was introduced towards the posterior aspect of the neural foramen at the level of the exiting nerve root and advanced until contact made with the superior articular process at the entrance to the neural foramen.  The needle was walked just anteriorly and medially to enter the foramen but advanced staying as posterior as possible within the neural foramen. In an AP view, the needle was advanced to lie in the midline of the silhouettes of the articular pillars.  Omnipaque contrast dye was injected under real-time digital subtraction to confirm appropriate placement and that there was no vascular uptake.  After negative aspiration for blood or CSF, 2ml of 1% lidocaine with epinephrine was instilled and no increased heart rate or systemic symptoms were noted. The final medication was then injected. This was performed at the left C5/6 level(s). The patient tolerated the procedure well.    The patient was monitored after the  procedure.  Patient was given post procedure and discharge instructions to follow at home. The patient was discharged in a stable condition.    Event Time In   In Facility 1258   In Pre-Procedure 1312   Physician Available    Anesthesia Available    Pre-Op: Bedside Procedure Start    Pre-Op: Bedside Procedure Stop    Pre-Procedure Complete 1345   Out of Pre-Procedure    Anesthesia Start    Anesthesia Start Data Collection    Setup Start    Setup Complete    In Room 1414   Prep Start    Procedure Prep Complete    Procedure Start 1422   Procedure Closing    Emergence    Procedure Finish 1427   Sedation Start 1413   Scope In    Extent Reached    Scope Out    Sedation End 1430   Out of Room 1430   Cleanup Start    Cleanup Complete    Cosmetic Start    Cosmetic Stop    Pain Mgmt In Room    Pain Mgmt Out Room    In Recovery    Anesthesia Finish    Bedside Procedure Start    Bedside Procedure Stop    Recovery Care Complete    Out of Recovery    To Phase II    In Phase II    Pain Mgmt Recovery Start    Pain Mgmt Recovery Stop    Obs Rec Start    Obs Rec Stop    Phase II Care Complete    Out of Phase II    Procedural Care Complete    Discharge    Pain Follow Up Needed    Pain Follow Up Complete      Moderate sedation was achieved with midazolam 4mg and fentanyl 25mcg.  Continuous monitoring of EKG, blood pressure and pulse oximetry was provided by a registered nurse during the entire course of the procedure under my supervision and recorded in the patient's medical record.   Total time for sedation was 17 minutes.

## 2020-12-17 NOTE — INTERVAL H&P NOTE
The patient has been examined and the H&P has been reviewed:    I concur with the findings and no changes have occurred since H&P was written.    Anesthesia/Surgery risks, benefits and alternative options discussed and understood by patient/family.    ASA 3, mallampati 2      Active Hospital Problems    Diagnosis  POA    Cervical radiculopathy [M54.12]  Yes      Resolved Hospital Problems   No resolved problems to display.

## 2020-12-18 ENCOUNTER — TELEPHONE (OUTPATIENT)
Dept: PAIN MEDICINE | Facility: CLINIC | Age: 60
End: 2020-12-18

## 2020-12-18 VITALS
RESPIRATION RATE: 18 BRPM | OXYGEN SATURATION: 98 % | HEART RATE: 67 BPM | TEMPERATURE: 98 F | SYSTOLIC BLOOD PRESSURE: 104 MMHG | WEIGHT: 195 LBS | BODY MASS INDEX: 26.41 KG/M2 | HEIGHT: 72 IN | DIASTOLIC BLOOD PRESSURE: 59 MMHG

## 2020-12-22 ENCOUNTER — TELEPHONE (OUTPATIENT)
Dept: PAIN MEDICINE | Facility: CLINIC | Age: 60
End: 2020-12-22

## 2021-01-08 ENCOUNTER — OFFICE VISIT (OUTPATIENT)
Dept: PAIN MEDICINE | Facility: CLINIC | Age: 61
End: 2021-01-08
Payer: MEDICARE

## 2021-01-08 VITALS
BODY MASS INDEX: 26.51 KG/M2 | DIASTOLIC BLOOD PRESSURE: 54 MMHG | RESPIRATION RATE: 18 BRPM | SYSTOLIC BLOOD PRESSURE: 104 MMHG | TEMPERATURE: 98 F | HEART RATE: 64 BPM | OXYGEN SATURATION: 98 % | WEIGHT: 195.44 LBS

## 2021-01-08 DIAGNOSIS — M54.12 CERVICAL RADICULOPATHY: Primary | ICD-10-CM

## 2021-01-08 DIAGNOSIS — M50.30 DDD (DEGENERATIVE DISC DISEASE), CERVICAL: ICD-10-CM

## 2021-01-08 PROCEDURE — 99214 OFFICE O/P EST MOD 30 MIN: CPT | Mod: PBBFAC,PN | Performed by: PHYSICIAN ASSISTANT

## 2021-01-08 PROCEDURE — 99999 PR PBB SHADOW E&M-EST. PATIENT-LVL IV: ICD-10-PCS | Mod: PBBFAC,,, | Performed by: PHYSICIAN ASSISTANT

## 2021-01-08 PROCEDURE — 99999 PR PBB SHADOW E&M-EST. PATIENT-LVL IV: CPT | Mod: PBBFAC,,, | Performed by: PHYSICIAN ASSISTANT

## 2021-01-08 PROCEDURE — 99213 PR OFFICE/OUTPT VISIT, EST, LEVL III, 20-29 MIN: ICD-10-PCS | Mod: S$PBB,,, | Performed by: PHYSICIAN ASSISTANT

## 2021-01-08 PROCEDURE — 99213 OFFICE O/P EST LOW 20 MIN: CPT | Mod: S$PBB,,, | Performed by: PHYSICIAN ASSISTANT

## 2021-01-11 ENCOUNTER — TELEPHONE (OUTPATIENT)
Dept: GASTROENTEROLOGY | Facility: CLINIC | Age: 61
End: 2021-01-11

## 2021-01-12 ENCOUNTER — TELEPHONE (OUTPATIENT)
Dept: GASTROENTEROLOGY | Facility: CLINIC | Age: 61
End: 2021-01-12

## 2021-01-12 DIAGNOSIS — Z01.812 ENCOUNTER FOR PREOPERATIVE SCREENING LABORATORY TESTING FOR COVID-19 VIRUS: ICD-10-CM

## 2021-01-12 DIAGNOSIS — Z11.52 ENCOUNTER FOR PREOPERATIVE SCREENING LABORATORY TESTING FOR COVID-19 VIRUS: ICD-10-CM

## 2021-01-14 ENCOUNTER — PATIENT MESSAGE (OUTPATIENT)
Dept: GASTROENTEROLOGY | Facility: CLINIC | Age: 61
End: 2021-01-14

## 2021-01-19 ENCOUNTER — TELEPHONE (OUTPATIENT)
Dept: GASTROENTEROLOGY | Facility: CLINIC | Age: 61
End: 2021-01-19

## 2021-03-16 ENCOUNTER — LAB VISIT (OUTPATIENT)
Dept: FAMILY MEDICINE | Facility: CLINIC | Age: 61
End: 2021-03-16
Payer: MEDICARE

## 2021-03-16 DIAGNOSIS — Z01.812 ENCOUNTER FOR PREOPERATIVE SCREENING LABORATORY TESTING FOR COVID-19 VIRUS: ICD-10-CM

## 2021-03-16 DIAGNOSIS — Z11.52 ENCOUNTER FOR PREOPERATIVE SCREENING LABORATORY TESTING FOR COVID-19 VIRUS: ICD-10-CM

## 2021-03-16 PROCEDURE — U0005 INFEC AGEN DETEC AMPLI PROBE: HCPCS | Performed by: INTERNAL MEDICINE

## 2021-03-16 PROCEDURE — U0003 INFECTIOUS AGENT DETECTION BY NUCLEIC ACID (DNA OR RNA); SEVERE ACUTE RESPIRATORY SYNDROME CORONAVIRUS 2 (SARS-COV-2) (CORONAVIRUS DISEASE [COVID-19]), AMPLIFIED PROBE TECHNIQUE, MAKING USE OF HIGH THROUGHPUT TECHNOLOGIES AS DESCRIBED BY CMS-2020-01-R: HCPCS | Performed by: INTERNAL MEDICINE

## 2021-03-17 LAB — SARS-COV-2 RNA RESP QL NAA+PROBE: NOT DETECTED

## 2021-03-18 ENCOUNTER — TELEPHONE (OUTPATIENT)
Dept: GASTROENTEROLOGY | Facility: CLINIC | Age: 61
End: 2021-03-18

## 2021-03-18 RX ORDER — ONDANSETRON 4 MG/1
4 TABLET, FILM COATED ORAL EVERY 6 HOURS PRN
Qty: 10 TABLET | Refills: 0 | Status: SHIPPED | OUTPATIENT
Start: 2021-03-18 | End: 2023-02-20

## 2021-03-19 ENCOUNTER — HOSPITAL ENCOUNTER (OUTPATIENT)
Facility: HOSPITAL | Age: 61
Discharge: HOME | End: 2021-03-19
Attending: INTERNAL MEDICINE | Admitting: INTERNAL MEDICINE
Payer: MEDICARE

## 2021-03-19 ENCOUNTER — ANESTHESIA EVENT (OUTPATIENT)
Dept: ENDOSCOPY | Facility: HOSPITAL | Age: 61
End: 2021-03-19
Payer: MEDICARE

## 2021-03-19 ENCOUNTER — ANESTHESIA (OUTPATIENT)
Dept: ENDOSCOPY | Facility: HOSPITAL | Age: 61
End: 2021-03-19
Payer: MEDICARE

## 2021-03-19 VITALS
WEIGHT: 195 LBS | SYSTOLIC BLOOD PRESSURE: 108 MMHG | OXYGEN SATURATION: 99 % | HEART RATE: 58 BPM | BODY MASS INDEX: 26.41 KG/M2 | DIASTOLIC BLOOD PRESSURE: 61 MMHG | HEIGHT: 72 IN | TEMPERATURE: 97 F | RESPIRATION RATE: 18 BRPM

## 2021-03-19 DIAGNOSIS — Z12.11 SCREEN FOR COLON CANCER: ICD-10-CM

## 2021-03-19 LAB
GLUCOSE SERPL-MCNC: 208 MG/DL (ref 70–110)
GLUCOSE SERPL-MCNC: 51 MG/DL (ref 70–110)

## 2021-03-19 PROCEDURE — 82962 GLUCOSE BLOOD TEST: CPT | Mod: PO | Performed by: INTERNAL MEDICINE

## 2021-03-19 PROCEDURE — D9220A PRA ANESTHESIA: Mod: ANES,,, | Performed by: ANESTHESIOLOGY

## 2021-03-19 PROCEDURE — 25000003 PHARM REV CODE 250: Mod: PO | Performed by: NURSE ANESTHETIST, CERTIFIED REGISTERED

## 2021-03-19 PROCEDURE — 63600175 PHARM REV CODE 636 W HCPCS: Mod: PO | Performed by: NURSE ANESTHETIST, CERTIFIED REGISTERED

## 2021-03-19 PROCEDURE — 25000003 PHARM REV CODE 250: Mod: PO | Performed by: INTERNAL MEDICINE

## 2021-03-19 PROCEDURE — S5010 5% DEXTROSE AND 0.45% SALINE: HCPCS | Mod: PO | Performed by: INTERNAL MEDICINE

## 2021-03-19 PROCEDURE — D9220A PRA ANESTHESIA: Mod: CRNA,,, | Performed by: NURSE ANESTHETIST, CERTIFIED REGISTERED

## 2021-03-19 PROCEDURE — 37000009 HC ANESTHESIA EA ADD 15 MINS: Mod: PO | Performed by: INTERNAL MEDICINE

## 2021-03-19 PROCEDURE — G0121 COLON CA SCRN NOT HI RSK IND: ICD-10-PCS | Mod: ,,, | Performed by: INTERNAL MEDICINE

## 2021-03-19 PROCEDURE — 63600175 PHARM REV CODE 636 W HCPCS: Mod: PO | Performed by: INTERNAL MEDICINE

## 2021-03-19 PROCEDURE — D9220A PRA ANESTHESIA: ICD-10-PCS | Mod: ANES,,, | Performed by: ANESTHESIOLOGY

## 2021-03-19 PROCEDURE — 37000008 HC ANESTHESIA 1ST 15 MINUTES: Mod: PO | Performed by: INTERNAL MEDICINE

## 2021-03-19 PROCEDURE — G0121 COLON CA SCRN NOT HI RSK IND: HCPCS | Mod: ,,, | Performed by: INTERNAL MEDICINE

## 2021-03-19 PROCEDURE — D9220A PRA ANESTHESIA: ICD-10-PCS | Mod: CRNA,,, | Performed by: NURSE ANESTHETIST, CERTIFIED REGISTERED

## 2021-03-19 PROCEDURE — G0121 COLON CA SCRN NOT HI RSK IND: HCPCS | Mod: PO | Performed by: INTERNAL MEDICINE

## 2021-03-19 RX ORDER — SODIUM CHLORIDE 0.9 % (FLUSH) 0.9 %
10 SYRINGE (ML) INJECTION
Status: DISCONTINUED | OUTPATIENT
Start: 2021-03-19 | End: 2021-03-19 | Stop reason: HOSPADM

## 2021-03-19 RX ORDER — DEXTROSE MONOHYDRATE AND SODIUM CHLORIDE 5; .45 G/100ML; G/100ML
INJECTION, SOLUTION INTRAVENOUS CONTINUOUS
Status: DISCONTINUED | OUTPATIENT
Start: 2021-03-19 | End: 2021-03-19 | Stop reason: HOSPADM

## 2021-03-19 RX ORDER — PROPOFOL 10 MG/ML
VIAL (ML) INTRAVENOUS
Status: DISCONTINUED | OUTPATIENT
Start: 2021-03-19 | End: 2021-03-19

## 2021-03-19 RX ORDER — LIDOCAINE HCL/PF 100 MG/5ML
SYRINGE (ML) INTRAVENOUS
Status: DISCONTINUED | OUTPATIENT
Start: 2021-03-19 | End: 2021-03-19

## 2021-03-19 RX ORDER — SODIUM CHLORIDE, SODIUM LACTATE, POTASSIUM CHLORIDE, CALCIUM CHLORIDE 600; 310; 30; 20 MG/100ML; MG/100ML; MG/100ML; MG/100ML
INJECTION, SOLUTION INTRAVENOUS CONTINUOUS
Status: DISCONTINUED | OUTPATIENT
Start: 2021-03-19 | End: 2021-03-19

## 2021-03-19 RX ADMIN — DEXTROSE AND SODIUM CHLORIDE: 5; .45 INJECTION, SOLUTION INTRAVENOUS at 08:03

## 2021-03-19 RX ADMIN — SODIUM CHLORIDE, SODIUM LACTATE, POTASSIUM CHLORIDE, AND CALCIUM CHLORIDE: .6; .31; .03; .02 INJECTION, SOLUTION INTRAVENOUS at 08:03

## 2021-03-19 RX ADMIN — PROPOFOL 40 MG: 10 INJECTION, EMULSION INTRAVENOUS at 09:03

## 2021-03-19 RX ADMIN — PROPOFOL 120 MG: 10 INJECTION, EMULSION INTRAVENOUS at 09:03

## 2021-03-19 RX ADMIN — LIDOCAINE HYDROCHLORIDE 75 MG: 20 INJECTION, SOLUTION INTRAVENOUS at 09:03

## 2021-03-29 NOTE — DISCHARGE INSTRUCTIONS
PAIN MANAGEMENT    Home care instructions   Apply ice pack to the injection site for 20 minute prior for the first 24 hours for soreness/discomfort at injection site   DO NOT USE HEAT FOR 24 HOURS   Keep site clean and dry for 24 hours, remove bandaid when desired   Do not drive until tomorrow  Take care when walking after a lumbar injection     STEROIDS OR RADIOFREQUENCY    May take 10-14 days for full effects  Avoid strenuous exercises for 2 days          Resume Aspirin, Plavix, or Coumadin the day after the procedure unless other wise instructed  Resume home medication as prescribed today      CALL PHYSICIAN FOR:   Severe increase in your usual pain or appearance of new pain   Prolonged or increasing weakness or numbness in the legs or arms   Fever greater then 100 degrees F..   Drainage from the incision site, redness, active bleeding or increased swelling at the injection site   Headache that increases when your head is upright and decreases when you lie flat    FOR EMERGENCIES:   Go directly to Emergency Department for Shortness of breath, chest pain, or problems breathing     left upper arm

## 2021-04-29 ENCOUNTER — PATIENT MESSAGE (OUTPATIENT)
Dept: RESEARCH | Facility: HOSPITAL | Age: 61
End: 2021-04-29

## 2021-08-10 ENCOUNTER — OFFICE VISIT (OUTPATIENT)
Dept: PAIN MEDICINE | Facility: CLINIC | Age: 61
End: 2021-08-10
Payer: MEDICARE

## 2021-08-10 VITALS
DIASTOLIC BLOOD PRESSURE: 51 MMHG | WEIGHT: 194 LBS | OXYGEN SATURATION: 96 % | BODY MASS INDEX: 26.31 KG/M2 | TEMPERATURE: 98 F | HEART RATE: 69 BPM | RESPIRATION RATE: 20 BRPM | SYSTOLIC BLOOD PRESSURE: 106 MMHG

## 2021-08-10 DIAGNOSIS — M50.30 DDD (DEGENERATIVE DISC DISEASE), CERVICAL: ICD-10-CM

## 2021-08-10 DIAGNOSIS — M51.36 DDD (DEGENERATIVE DISC DISEASE), LUMBAR: Primary | ICD-10-CM

## 2021-08-10 PROCEDURE — 99213 OFFICE O/P EST LOW 20 MIN: CPT | Mod: S$PBB,,, | Performed by: PHYSICIAN ASSISTANT

## 2021-08-10 PROCEDURE — 99215 OFFICE O/P EST HI 40 MIN: CPT | Mod: PBBFAC,PN | Performed by: PHYSICIAN ASSISTANT

## 2021-08-10 PROCEDURE — 99999 PR PBB SHADOW E&M-EST. PATIENT-LVL V: CPT | Mod: PBBFAC,,, | Performed by: PHYSICIAN ASSISTANT

## 2021-08-10 PROCEDURE — 99999 PR PBB SHADOW E&M-EST. PATIENT-LVL V: ICD-10-PCS | Mod: PBBFAC,,, | Performed by: PHYSICIAN ASSISTANT

## 2021-08-10 PROCEDURE — 99213 PR OFFICE/OUTPT VISIT, EST, LEVL III, 20-29 MIN: ICD-10-PCS | Mod: S$PBB,,, | Performed by: PHYSICIAN ASSISTANT

## 2021-08-18 ENCOUNTER — OFFICE VISIT (OUTPATIENT)
Dept: CARDIOLOGY | Facility: CLINIC | Age: 61
End: 2021-08-18
Payer: MEDICARE

## 2021-08-18 VITALS
DIASTOLIC BLOOD PRESSURE: 60 MMHG | WEIGHT: 195.31 LBS | SYSTOLIC BLOOD PRESSURE: 102 MMHG | BODY MASS INDEX: 26.45 KG/M2 | HEIGHT: 72 IN

## 2021-08-18 DIAGNOSIS — E10.42 DIABETIC POLYNEUROPATHY ASSOCIATED WITH TYPE 1 DIABETES MELLITUS: Primary | ICD-10-CM

## 2021-08-18 DIAGNOSIS — I25.10 CORONARY ARTERY DISEASE INVOLVING NATIVE CORONARY ARTERY OF NATIVE HEART WITHOUT ANGINA PECTORIS: ICD-10-CM

## 2021-08-18 DIAGNOSIS — I20.9 ANGINA PECTORIS, UNSPECIFIED: ICD-10-CM

## 2021-08-18 PROCEDURE — 99999 PR PBB SHADOW E&M-EST. PATIENT-LVL III: ICD-10-PCS | Mod: PBBFAC,,, | Performed by: INTERNAL MEDICINE

## 2021-08-18 PROCEDURE — 99204 OFFICE O/P NEW MOD 45 MIN: CPT | Mod: S$PBB,,, | Performed by: INTERNAL MEDICINE

## 2021-08-18 PROCEDURE — 99204 PR OFFICE/OUTPT VISIT, NEW, LEVL IV, 45-59 MIN: ICD-10-PCS | Mod: S$PBB,,, | Performed by: INTERNAL MEDICINE

## 2021-08-18 PROCEDURE — 99999 PR PBB SHADOW E&M-EST. PATIENT-LVL III: CPT | Mod: PBBFAC,,, | Performed by: INTERNAL MEDICINE

## 2021-08-18 PROCEDURE — 99213 OFFICE O/P EST LOW 20 MIN: CPT | Mod: PBBFAC,PO | Performed by: INTERNAL MEDICINE

## 2021-11-05 ENCOUNTER — IMMUNIZATION (OUTPATIENT)
Dept: PRIMARY CARE CLINIC | Facility: CLINIC | Age: 61
End: 2021-11-05
Payer: MEDICARE

## 2021-11-05 DIAGNOSIS — Z23 NEED FOR VACCINATION: Primary | ICD-10-CM

## 2021-11-05 PROCEDURE — 0064A COVID-19, MRNA, LNP-S, PF, 100 MCG/0.25 ML DOSE VACCINE (MODERNA BOOSTER): CPT | Mod: CV19,PBBFAC | Performed by: INTERNAL MEDICINE

## 2022-02-25 ENCOUNTER — PATIENT MESSAGE (OUTPATIENT)
Dept: CARDIOLOGY | Facility: CLINIC | Age: 62
End: 2022-02-25
Payer: MEDICARE

## 2022-03-24 ENCOUNTER — TELEPHONE (OUTPATIENT)
Dept: CARDIOLOGY | Facility: CLINIC | Age: 62
End: 2022-03-24
Payer: MEDICARE

## 2022-03-24 NOTE — TELEPHONE ENCOUNTER
REC FAX FROM [facility] REQUESTING CLEARANCE FOR POTENTIAL UPPER AND LOWER BLEPHAROPLASTY UNDER GENERAL ON TBD AT TBD AND TO HOLD ASA X 5 DAYS PRIOR    THX

## 2022-07-06 ENCOUNTER — TELEPHONE (OUTPATIENT)
Dept: CARDIOLOGY | Facility: CLINIC | Age: 62
End: 2022-07-06
Payer: MEDICARE

## 2022-07-06 NOTE — TELEPHONE ENCOUNTER
REC FAX 7/5/22 FROM [facility] REQUESTING CLEARANCE FOR RIGHT ACHILLES TENDON REPAIR UNDER GENERAL ANESTHESIA SCHEDULED 7/7/22   THANK YOU

## 2022-12-29 ENCOUNTER — TELEPHONE (OUTPATIENT)
Dept: PAIN MEDICINE | Facility: CLINIC | Age: 62
End: 2022-12-29
Payer: MEDICARE

## 2022-12-29 NOTE — TELEPHONE ENCOUNTER
----- Message from Cathie Watkins sent at 12/28/2022  3:32 PM CST -----  Contact: [phone]  Type: Needs Medical Advice  Who Called:  Pt     Best Call Back Number: [phone]    Additional Information: Pt is calling to schedule injection for his back. Pt stated you can call back tomorrow in the am.

## 2022-12-29 NOTE — TELEPHONE ENCOUNTER
----- Message from Brisa Gutierrez sent at 12/29/2022  2:17 PM CST -----  Contact: Self  Type:  Patient Returning Call    Who Called:  Patient  Who Left Message for Patient:  Danielle  Does the patient know what this is regarding?:  Yes  Best Call Back Number:  [phone]  Additional Information:

## 2022-12-29 NOTE — TELEPHONE ENCOUNTER
Left msg on machine that pt will need an appt before imaging can be scheduled and to call back to clinic to schedule

## 2022-12-29 NOTE — TELEPHONE ENCOUNTER
Spoke with pt and he advised that his back pain has returned and is radiating from his hip down his leg. He would like to set up [facility]. Per last note, if pain returns, he will need x-rays and MRI. Please place orders and we will call him to schedule. Thanks!

## 2023-01-19 ENCOUNTER — OFFICE VISIT (OUTPATIENT)
Dept: PAIN MEDICINE | Facility: CLINIC | Age: 63
End: 2023-01-19
Payer: MEDICARE

## 2023-01-19 ENCOUNTER — HOSPITAL ENCOUNTER (OUTPATIENT)
Dept: RADIOLOGY | Facility: HOSPITAL | Age: 63
Discharge: HOME | End: 2023-01-19
Attending: PHYSICIAN ASSISTANT
Payer: MEDICARE

## 2023-01-19 VITALS
BODY MASS INDEX: 26.07 KG/M2 | HEIGHT: 72 IN | WEIGHT: 192.44 LBS | SYSTOLIC BLOOD PRESSURE: 106 MMHG | HEART RATE: 66 BPM | DIASTOLIC BLOOD PRESSURE: 52 MMHG

## 2023-01-19 DIAGNOSIS — M51.36 DDD (DEGENERATIVE DISC DISEASE), LUMBAR: Primary | ICD-10-CM

## 2023-01-19 DIAGNOSIS — M51.36 DDD (DEGENERATIVE DISC DISEASE), LUMBAR: ICD-10-CM

## 2023-01-19 DIAGNOSIS — M54.16 LUMBAR RADICULOPATHY: ICD-10-CM

## 2023-01-19 PROCEDURE — 99214 OFFICE O/P EST MOD 30 MIN: CPT | Mod: PBBFAC,PN | Performed by: PHYSICIAN ASSISTANT

## 2023-01-19 PROCEDURE — 99213 PR OFFICE/OUTPT VISIT, EST, LEVL III, 20-29 MIN: ICD-10-PCS | Mod: S$PBB,,, | Performed by: PHYSICIAN ASSISTANT

## 2023-01-19 PROCEDURE — 72114 XR LUMBAR SPINE 5 VIEW WITH FLEX AND EXT: ICD-10-PCS | Mod: 26,,, | Performed by: RADIOLOGY

## 2023-01-19 PROCEDURE — 72114 X-RAY EXAM L-S SPINE BENDING: CPT | Mod: 26,,, | Performed by: RADIOLOGY

## 2023-01-19 PROCEDURE — 99999 PR PBB SHADOW E&M-EST. PATIENT-LVL IV: CPT | Mod: PBBFAC,,, | Performed by: PHYSICIAN ASSISTANT

## 2023-01-19 PROCEDURE — 72114 X-RAY EXAM L-S SPINE BENDING: CPT | Mod: TC,PO

## 2023-01-19 PROCEDURE — 99213 OFFICE O/P EST LOW 20 MIN: CPT | Mod: S$PBB,,, | Performed by: PHYSICIAN ASSISTANT

## 2023-01-19 PROCEDURE — 99999 PR PBB SHADOW E&M-EST. PATIENT-LVL IV: ICD-10-PCS | Mod: PBBFAC,,, | Performed by: PHYSICIAN ASSISTANT

## 2023-01-30 NOTE — PROGRESS NOTES
This note was completed with dictation software and grammatical errors may exist.    CC:  Low back and left leg pain    HPI:  The patient is a 62-year-old man with a history of CAD, CABG, diabetes who presents in referral from Dr. Boss for neck pain and left shoulder pain.  He returns in follow-up today with low back pain.  We have seen him in the past for neck pain but he denies any major neck issues at this time.  He does report having a lumbar epidural steroid injection in 2017 with relief and his symptoms just recently returned.  He describes pain across the low back radiating to the left buttock, posterior thigh and calf.  The pain is worse with walking and improved with sitting.  He has been doing martial arts classes without increased pain but is careful with what he does.  He denies weakness or numbness, denies bladder or bowel incontinence.    Pain intervention history:  He has a history of an injection for his lumbar spine which help a great deal.  He is status post C7-T1 interlaminar epidural steroid injection on 11/16/2020 with minimal relief.   Status post left C5/6 transforaminal epidural steroid injection on 12/17/2020 with 0% relief. He does report having a lumbar epidural steroid injection in 2017 with relief.     Antineuropathics:  NSAIDs:  Physical therapy:  Has done physical therapy with improvement in his neck and left arm pain  Antidepressants:  Muscle relaxers:  Klonopin  Opioids:  Antiplatelets/Anticoagulants:  ASA 81      ROS:  He reports joint stiffness and anxiety.  Balance of review of systems is negative.    No results found for: LABA1C, HGBA1C    Lab Results   Component Value Date    WBC 5.23 12/10/2019    HGB 14.9 12/10/2019    HCT 43.9 12/10/2019    MCV 86 12/10/2019     12/10/2019             Past Medical History:   Diagnosis Date    Anxiety     Coronary artery disease     Diabetes mellitus, type 2     Hypertension     patient takes because of diabetes-not htn    Renal  disorder     kidney stones       Past Surgical History:   Procedure Laterality Date    CARDIAC SURGERY      CATARACT EXTRACTION, BILATERAL      COLONOSCOPY      COLONOSCOPY N/A 3/19/2021    Procedure: COLONOSCOPY;  Surgeon: Nura Ayala MD;  Location: [facility] ENDO;  Service: Endoscopy;  Laterality: N/A;    CORONARY ARTERY BYPASS GRAFT      ENDOSCOPIC NASAL SEPTOPLASTY      EPIDURAL STEROID INJECTION INTO CERVICAL SPINE N/A 11/16/2020    Procedure: Injection-steroid-epidural-cervical to left;  Surgeon: Felipe Mathews MD;  Location: [facility] OR;  Service: Pain Management;  Laterality: N/A;    EYE SURGERY      vitrectomy, ahmed valve    LITHOTRIPSY      LITHOTRIPSY      ORTHOPEDIC SURGERY      rotaror cuff repair Bilateral     TRANSFORAMINAL EPIDURAL INJECTION OF STEROID Left 12/17/2020    Procedure: Injection,steroid,epidural,transforaminal approach C5,C6;  Surgeon: Felipe Mathews MD;  Location: [facility] OR;  Service: Pain Management;  Laterality: Left;    TRIGGER FINGER RELEASE Right        Social History     Socioeconomic History    Marital status: Single   Tobacco Use    Smoking status: Never    Smokeless tobacco: Never   Substance and Sexual Activity    Alcohol use: Yes     Alcohol/week: 3.0 standard drinks     Types: 3 Cans of beer per week     Comment: only on weekends    Drug use: Not Currently         Medications/Allergies: See med card    Vitals:    01/19/23 1434   BP: (!) 106/52   Pulse: 66   Weight: 87.3 kg (192 lb 7.4 oz)   Height: 6' (1.829 m)   PainSc: 0-No pain         Physical exam:  Gen: A and O x3, pleasant, well-groomed  Skin: No rashes or obvious lesions  HEENT: PERRLA, no obvious deformities on ears or in canals.Trachea midline.  CVS: Regular rate and rhythm, normal palpable pulses.  Resp: Clear to auscultation bilaterally, no wheezes or rales.  Abdomen: Soft, NT/ND.  Musculoskeletal: Able to heel walk, toe walk. No antalgic gait.     Neuro:  Lower extremities: 5/5 strength  bilaterally  Reflexes: Patellar 2+ bilaterally, Achilles 2+ right, 1+ left.  Sensory:  Intact and symmetrical to light touch and pinprick in L2-S1 dermatomes bilaterally.    Lumbar spine:  Lumbar spine: ROM is full with flexion extension and oblique extension with increased left low back pain during left oblique extension and flexion.  Jeronimo's test causes no increased pain on either side.    Supine straight leg raise causes increased low back and left leg pain.  Internal and external rotation of the hip causes no increased pain on either side.  Myofascial exam: No tenderness to palpation across lumbar paraspinous muscles.    Imagin20 MRI C-spine:   There is degenerative disc desiccation at virtually every cervical level with disc space narrowing and marginal osteophyte formation observed at C5-C6..   The cervical spinal cord is normal in signal intensity without evidence of cord edema, myelomalacia, or cord syrinx.  No epidural fluid collections or masses.   The incidentally observed soft tissues of the neck show no significant abnormalities.   C2-C3: There is left ligamentum flavum thickening.  There is bilateral facet arthropathy, left greater than right.  No disc protrusion or extrusion. No central canal stenosis or neuroforaminal stenosis.   C3-C4: There is bilateral hypertrophic facet arthropathy.  There is minimal left uncovertebral spurring.  There is, at most, mild bilateral neuroforaminal stenosis.  There is effacement of the anterior CSF sleeve.  No central canal stenosis.   C4-C5: There is bilateral uncovertebral spurring, left greater than right, and there is prominent left facet arthropathy.  There is severe left neuroforaminal stenosis.  Please correlate clinically for symptoms referable to the left C5 nerve.  No right neuroforaminal stenosis.  There is effacement of the anterior CSF sleeve.  No overall central canal stenosis.   C5-C6: There is prominent bilateral uncovertebral spurring and  bilateral facet arthropathy, left greater than right.  There is a broad central disc protrusion superimposed upon a posterior disc osteophyte complex.  There is moderate central canal stenosis and flattening of the ventral cord.  There is ligamentum flavum thickening.  There is moderate right and severe left neuroforaminal stenosis.  Please correlate clinically for symptoms referable to the C6 nerves, left greater than right.   C6-C7: There is ligamentum flavum thickening and bilateral uncovertebral spurring, right greater than left.  No left neuroforaminal stenosis.  There is, at most, mild right neuroforaminal stenosis.  There is mild overall central canal stenosis.  C7-T1: There is bilateral facet arthropathy and ligamentum flavum thickening.  There is right uncovertebral spurring.  No central canal or neuroforaminal stenosis.    7/11/20 MRI Left shoulder:  A small fragment of shrapnel in the superficial aspect of the lateral deltoid muscle causes metallic artifact.  There is degenerative arthrosis of the glenohumeral joint with joint space narrowing, articular cartilage thinning, and degenerative fraying of the glenoid labrum.  A very small joint effusion is also present.  There is degenerative tendinosis of the supraspinatus tendon without evidence of significant tear.  Other rotator cuff tendons appear normal.  The acromioclavicular joint is relatively well maintained.  Visualized ligaments appear intact.  Bone marrow signal appears normal.    Assessment:   The patient is a 62-year-old man with a history of CAD, CABG, diabetes who presents in referral from Dr. Boss for neck pain and left shoulder pain.   1. DDD (degenerative disc disease), lumbar  X-Ray Lumbar Complete Including Flex And Ext    MRI Lumbar Spine Without Contrast      2. Lumbar radiculopathy              Plan:  1. Since he is experiencing radicular leg pain I am going to order lumbar spine x-rays and a lumbar spine MRI for further evaluation.   He will continue to exercise on a regular basis but this has not improved his pain.  He will have a virtual follow-up to review the images and discuss recommendations.

## 2023-02-02 ENCOUNTER — HOSPITAL ENCOUNTER (OUTPATIENT)
Dept: RADIOLOGY | Facility: HOSPITAL | Age: 63
Discharge: HOME | End: 2023-02-02
Attending: PHYSICIAN ASSISTANT
Payer: MEDICARE

## 2023-02-02 DIAGNOSIS — M51.36 DDD (DEGENERATIVE DISC DISEASE), LUMBAR: ICD-10-CM

## 2023-02-02 PROCEDURE — 72148 MRI LUMBAR SPINE W/O DYE: CPT | Mod: 26,,, | Performed by: RADIOLOGY

## 2023-02-02 PROCEDURE — 72148 MRI LUMBAR SPINE W/O DYE: CPT | Mod: TC,PO

## 2023-02-02 PROCEDURE — 72148 MRI LUMBAR SPINE WITHOUT CONTRAST: ICD-10-PCS | Mod: 26,,, | Performed by: RADIOLOGY

## 2023-02-06 ENCOUNTER — PATIENT MESSAGE (OUTPATIENT)
Dept: PAIN MEDICINE | Facility: CLINIC | Age: 63
End: 2023-02-06

## 2023-02-06 ENCOUNTER — TELEPHONE (OUTPATIENT)
Dept: PAIN MEDICINE | Facility: CLINIC | Age: 63
End: 2023-02-06
Payer: MEDICARE

## 2023-02-06 NOTE — TELEPHONE ENCOUNTER
----- Message from Kimberly De La Torre sent at 2/6/2023  4:00 PM CST -----  Contact: self  Type:  Sooner Appointment Request    Caller is requesting a sooner appointment.    Name of Caller:Pt  When is the first available appointment?Pt has a virtual for 2/6 at 1:20  Symptoms:Need to renew meds  Would the patient rather a call back or a response via [facility]? call  Best Call Back Number:[phone]    Additional Information: Pt states that he was logged on a ready for virtual however, no one appeared...

## 2023-02-07 ENCOUNTER — PATIENT MESSAGE (OUTPATIENT)
Dept: PAIN MEDICINE | Facility: CLINIC | Age: 63
End: 2023-02-07
Payer: MEDICARE

## 2023-02-14 ENCOUNTER — TELEPHONE (OUTPATIENT)
Dept: CARDIOLOGY | Facility: CLINIC | Age: 63
End: 2023-02-14
Payer: MEDICARE

## 2023-02-14 NOTE — TELEPHONE ENCOUNTER
REC FAX FROM [facility] REQUESTING CLEARANCE FOR RIGHT KNEE ATS PARTIAL MEDIAL MENISCECTOMY UNDER GENERAL ANESTHESIA SCHEDULED 3/2/23 - PT TAKES ASA  LOV: 8/1821  THX

## 2023-02-15 ENCOUNTER — TELEPHONE (OUTPATIENT)
Dept: CARDIOLOGY | Facility: CLINIC | Age: 63
End: 2023-02-15
Payer: MEDICARE

## 2023-02-16 ENCOUNTER — OFFICE VISIT (OUTPATIENT)
Dept: PAIN MEDICINE | Facility: CLINIC | Age: 63
End: 2023-02-16
Payer: MEDICARE

## 2023-02-16 ENCOUNTER — TELEPHONE (OUTPATIENT)
Dept: PAIN MEDICINE | Facility: CLINIC | Age: 63
End: 2023-02-16
Payer: MEDICARE

## 2023-02-16 VITALS
DIASTOLIC BLOOD PRESSURE: 63 MMHG | SYSTOLIC BLOOD PRESSURE: 117 MMHG | BODY MASS INDEX: 26.41 KG/M2 | WEIGHT: 195 LBS | HEIGHT: 72 IN | HEART RATE: 61 BPM

## 2023-02-16 DIAGNOSIS — M54.16 LUMBAR RADICULOPATHY: Primary | ICD-10-CM

## 2023-02-16 DIAGNOSIS — M51.36 DDD (DEGENERATIVE DISC DISEASE), LUMBAR: ICD-10-CM

## 2023-02-16 DIAGNOSIS — E10.9 TYPE 1 DIABETES MELLITUS WITHOUT COMPLICATION: ICD-10-CM

## 2023-02-16 PROCEDURE — 99214 PR OFFICE/OUTPT VISIT, EST, LEVL IV, 30-39 MIN: ICD-10-PCS | Mod: S$PBB,,, | Performed by: PHYSICIAN ASSISTANT

## 2023-02-16 PROCEDURE — 99214 OFFICE O/P EST MOD 30 MIN: CPT | Mod: S$PBB,,, | Performed by: PHYSICIAN ASSISTANT

## 2023-02-16 PROCEDURE — 99999 PR PBB SHADOW E&M-EST. PATIENT-LVL IV: CPT | Mod: PBBFAC,,, | Performed by: PHYSICIAN ASSISTANT

## 2023-02-16 PROCEDURE — 99999 PR PBB SHADOW E&M-EST. PATIENT-LVL IV: ICD-10-PCS | Mod: PBBFAC,,, | Performed by: PHYSICIAN ASSISTANT

## 2023-02-16 PROCEDURE — 99214 OFFICE O/P EST MOD 30 MIN: CPT | Mod: PBBFAC,PN | Performed by: PHYSICIAN ASSISTANT

## 2023-02-16 RX ORDER — ALPRAZOLAM 0.5 MG/1
1 TABLET, ORALLY DISINTEGRATING ORAL ONCE AS NEEDED
Status: CANCELLED | OUTPATIENT
Start: 2023-02-16 | End: 2034-07-15

## 2023-02-16 NOTE — TELEPHONE ENCOUNTER
Is patient on anti-coagulants? -> Yes Cmt: ASA, clearance needed          Facility Name: -> [facility]           Follow-up: -> 4 weeks                  Priority: Routine  Class: Normal     Future Order Information       Expires on:02/16/2024            Expected by:02/16/2023                    Associated Diagnoses       M54.16 Lumbar radiculopathy       Procedure -> Epidural Injection (specify level) Cmt: L5/S1 to left, local          Physician -> Reid           Is patient on anti-coagulants? -> Yes Cmt: ASA, clearance needed          Pre-op Diagnosis -> Lumbar radicul   opathy           Facility Name: -> [facility]           Follow-up: -> 4 weeks

## 2023-02-16 NOTE — TELEPHONE ENCOUNTER
Spoke with pt.  Scheduled  for 03/09 went over instruction.   Msg fax   Dr Brannon, may this pt hold his asa for 7 days prior to ZABRINA procedure?

## 2023-02-20 ENCOUNTER — OFFICE VISIT (OUTPATIENT)
Dept: CARDIOLOGY | Facility: CLINIC | Age: 63
End: 2023-02-20
Payer: MEDICARE

## 2023-02-20 VITALS
HEART RATE: 51 BPM | DIASTOLIC BLOOD PRESSURE: 71 MMHG | SYSTOLIC BLOOD PRESSURE: 124 MMHG | WEIGHT: 196.19 LBS | BODY MASS INDEX: 26.57 KG/M2 | HEIGHT: 72 IN

## 2023-02-20 DIAGNOSIS — I25.10 CORONARY ARTERY DISEASE INVOLVING NATIVE CORONARY ARTERY OF NATIVE HEART WITHOUT ANGINA PECTORIS: Primary | ICD-10-CM

## 2023-02-20 DIAGNOSIS — E10.42 DIABETIC POLYNEUROPATHY ASSOCIATED WITH TYPE 1 DIABETES MELLITUS: ICD-10-CM

## 2023-02-20 DIAGNOSIS — I20.9 ANGINA PECTORIS, UNSPECIFIED: ICD-10-CM

## 2023-02-20 PROBLEM — Z01.810 PREOP CARDIOVASCULAR EXAM: Status: ACTIVE | Noted: 2021-03-19

## 2023-02-20 PROCEDURE — 99214 PR OFFICE/OUTPT VISIT, EST, LEVL IV, 30-39 MIN: ICD-10-PCS | Mod: S$PBB,,, | Performed by: INTERNAL MEDICINE

## 2023-02-20 PROCEDURE — 99999 PR PBB SHADOW E&M-EST. PATIENT-LVL IV: CPT | Mod: PBBFAC,,, | Performed by: INTERNAL MEDICINE

## 2023-02-20 PROCEDURE — 99999 PR PBB SHADOW E&M-EST. PATIENT-LVL IV: ICD-10-PCS | Mod: PBBFAC,,, | Performed by: INTERNAL MEDICINE

## 2023-02-20 PROCEDURE — 99214 OFFICE O/P EST MOD 30 MIN: CPT | Mod: PBBFAC,PO | Performed by: INTERNAL MEDICINE

## 2023-02-20 PROCEDURE — 93010 EKG 12-LEAD: ICD-10-PCS | Mod: ,,, | Performed by: INTERNAL MEDICINE

## 2023-02-20 PROCEDURE — 99214 OFFICE O/P EST MOD 30 MIN: CPT | Mod: S$PBB,,, | Performed by: INTERNAL MEDICINE

## 2023-02-20 PROCEDURE — 93010 ELECTROCARDIOGRAM REPORT: CPT | Mod: ,,, | Performed by: INTERNAL MEDICINE

## 2023-02-20 PROCEDURE — 93005 ELECTROCARDIOGRAM TRACING: CPT | Mod: PO

## 2023-02-20 RX ORDER — TERBINAFINE HYDROCHLORIDE 250 MG/1
250 TABLET ORAL DAILY
COMMUNITY

## 2023-02-20 RX ORDER — TESTOSTERONE ENANTHATE 200 MG/ML
75 VIAL (ML) INTRAMUSCULAR WEEKLY
COMMUNITY

## 2023-02-20 NOTE — H&P (VIEW-ONLY)
This note was completed with dictation software and grammatical errors may exist.    CC:  Low back and left leg pain    HPI:  The patient is a 63-year-old man with a history of CAD, CABG, diabetes who presents in referral from Dr. Boss for neck pain and left shoulder pain.  He returns in follow-up today to review his lumbar spine MRI results.  He continues to complain of low back pain that radiates to the left buttock, posterior thigh and calf.  His pain is worse with standing and walking and improved with sitting.  He denies weakness or numbness, denies incontinence.    Pain intervention history:  He has a history of an injection for his lumbar spine which help a great deal.  He is status post C7-T1 interlaminar epidural steroid injection on 11/16/2020 with minimal relief.   Status post left C5/6 transforaminal epidural steroid injection on 12/17/2020 with 0% relief. He does report having a lumbar epidural steroid injection in 2017 with relief.     Antineuropathics:  NSAIDs:  Physical therapy:  Has done physical therapy with improvement in his neck and left arm pain  Antidepressants:  Muscle relaxers:  Klonopin  Opioids:  Antiplatelets/Anticoagulants:  ASA 81      ROS:  He reports joint stiffness and anxiety.  Balance of review of systems is negative.    No results found for: LABA1C, HGBA1C    Lab Results   Component Value Date    WBC 5.23 12/10/2019    HGB 14.9 12/10/2019    HCT 43.9 12/10/2019    MCV 86 12/10/2019     12/10/2019             Past Medical History:   Diagnosis Date    Anxiety     Coronary artery disease     Diabetes mellitus, type 2     Hypertension     patient takes because of diabetes-not htn    Renal disorder     kidney stones       Past Surgical History:   Procedure Laterality Date    CARDIAC SURGERY      CATARACT EXTRACTION, BILATERAL      COLONOSCOPY      COLONOSCOPY N/A 3/19/2021    Procedure: COLONOSCOPY;  Surgeon: Nura Ayala MD;  Location: [facility];  Service: Endoscopy;   Laterality: N/A;    CORONARY ARTERY BYPASS GRAFT      ENDOSCOPIC NASAL SEPTOPLASTY      EPIDURAL STEROID INJECTION INTO CERVICAL SPINE N/A 11/16/2020    Procedure: Injection-steroid-epidural-cervical to left;  Surgeon: Felipe Mathews MD;  Location: [facility] OR;  Service: Pain Management;  Laterality: N/A;    EYE SURGERY      vitrectomy, ahmed valve    LITHOTRIPSY      LITHOTRIPSY      ORTHOPEDIC SURGERY      rotaror cuff repair Bilateral     TRANSFORAMINAL EPIDURAL INJECTION OF STEROID Left 12/17/2020    Procedure: Injection,steroid,epidural,transforaminal approach C5,C6;  Surgeon: Felipe Mathews MD;  Location: [facility] OR;  Service: Pain Management;  Laterality: Left;    TRIGGER FINGER RELEASE Right        Social History     Socioeconomic History    Marital status: Single   Tobacco Use    Smoking status: Never    Smokeless tobacco: Never   Substance and Sexual Activity    Alcohol use: Yes     Alcohol/week: 3.0 standard drinks     Types: 3 Cans of beer per week     Comment: only on weekends    Drug use: Not Currently         Medications/Allergies: See med card    Vitals:    02/16/23 0806   BP: 117/63   Pulse: 61   Weight: 88.5 kg (195 lb)   Height: 6' (1.829 m)   PainSc:   4         Physical exam:  Gen: A and O x3, pleasant, well-groomed  Skin: No rashes or obvious lesions  HEENT: PERRLA, no obvious deformities on ears or in canals.Trachea midline.  CVS: Regular rate and rhythm, normal palpable pulses.  Resp: Clear to auscultation bilaterally, no wheezes or rales.  Abdomen: Soft, NT/ND.  Musculoskeletal: Able to heel walk, toe walk. No antalgic gait.     Neuro:  Lower extremities: 5/5 strength bilaterally  Reflexes: Patellar 2+ bilaterally, Achilles 2+ right, 1+ left.  Sensory:  Intact and symmetrical to light touch and pinprick in L2-S1 dermatomes bilaterally.    Lumbar spine:  Lumbar spine: ROM is full with flexion extension and oblique extension with increased left low back pain during left oblique extension  and flexion.  Jeronimo's test causes no increased pain on either side.    Supine straight leg raise causes increased low back and left leg pain.  Internal and external rotation of the hip causes no increased pain on either side.  Myofascial exam: No tenderness to palpation across lumbar paraspinous muscles.    Imagin20 MRI C-spine:   There is degenerative disc desiccation at virtually every cervical level with disc space narrowing and marginal osteophyte formation observed at C5-C6..   The cervical spinal cord is normal in signal intensity without evidence of cord edema, myelomalacia, or cord syrinx.  No epidural fluid collections or masses.   The incidentally observed soft tissues of the neck show no significant abnormalities.   C2-C3: There is left ligamentum flavum thickening.  There is bilateral facet arthropathy, left greater than right.  No disc protrusion or extrusion. No central canal stenosis or neuroforaminal stenosis.   C3-C4: There is bilateral hypertrophic facet arthropathy.  There is minimal left uncovertebral spurring.  There is, at most, mild bilateral neuroforaminal stenosis.  There is effacement of the anterior CSF sleeve.  No central canal stenosis.   C4-C5: There is bilateral uncovertebral spurring, left greater than right, and there is prominent left facet arthropathy.  There is severe left neuroforaminal stenosis.  Please correlate clinically for symptoms referable to the left C5 nerve.  No right neuroforaminal stenosis.  There is effacement of the anterior CSF sleeve.  No overall central canal stenosis.   C5-C6: There is prominent bilateral uncovertebral spurring and bilateral facet arthropathy, left greater than right.  There is a broad central disc protrusion superimposed upon a posterior disc osteophyte complex.  There is moderate central canal stenosis and flattening of the ventral cord.  There is ligamentum flavum thickening.  There is moderate right and severe left neuroforaminal  stenosis.  Please correlate clinically for symptoms referable to the C6 nerves, left greater than right.   C6-C7: There is ligamentum flavum thickening and bilateral uncovertebral spurring, right greater than left.  No left neuroforaminal stenosis.  There is, at most, mild right neuroforaminal stenosis.  There is mild overall central canal stenosis.  C7-T1: There is bilateral facet arthropathy and ligamentum flavum thickening.  There is right uncovertebral spurring.  No central canal or neuroforaminal stenosis.    7/11/20 MRI Left shoulder:  A small fragment of shrapnel in the superficial aspect of the lateral deltoid muscle causes metallic artifact.  There is degenerative arthrosis of the glenohumeral joint with joint space narrowing, articular cartilage thinning, and degenerative fraying of the glenoid labrum.  A very small joint effusion is also present.  There is degenerative tendinosis of the supraspinatus tendon without evidence of significant tear.  Other rotator cuff tendons appear normal.  The acromioclavicular joint is relatively well maintained.  Visualized ligaments appear intact.  Bone marrow signal appears normal.    02/02/2023 MRI lumbar spine   Vertebral column: The lumbar vertebral bodies maintain normal height.  There is no fracture.  Baseline marrow signal is normal.  There is mild disc space narrowing at the L1-2 level where there is also mild osteophyte formation.  There is no significant disc space narrowing.     Spinal canal, conus, epidural space: The spinal canal is small on a developmental basis.  The conus terminates at the level of L1 and is normal in contour and signal intensity.  There is no abnormal epidural mass or fluid collection.     Findings by level:     T11-12: There is bilateral facet joint arthropathy.  There is also left-sided ligamentum flavum thickening with ossification.  This narrows the left posterolateral dural sac and contributes to mild spinal stenosis.  There is no  cord compression.  There is also moderate left foraminal stenosis.     T12-L1: There is mild facet joint arthropathy.  There is no spinal canal or significant foraminal stenosis.     L1-2: There is mild disc space narrowing.  There is a diffuse disc bulge with mild osteophytic ridging.  There is left greater than right facet joint arthropathy with ossification of the ligamentum flavum worse on the left as well.  All of these factors contribute to severe spinal stenosis, crowding of the lateral recess, left greater than right as well as mild bilateral foraminal stenosis.     L2-3: There is a mild disc bulge.  There is facet joint arthropathy.  Again, there is left greater than right ligamentum flavum thickening with ossification suspected.  There is mild-to-moderate spinal stenosis, moderate crowding of the left lateral recess with mild bilateral foraminal stenosis.     L3-4: There is a diffuse disc bulge with osteophytic ridging and superimposed shallow broad right paracentral and foraminal disc protrusion.  There is facet joint arthropathy with ligamentum flavum thickening as well as mildly prominent dorsal epidural fat.  All of these factors contribute to moderate to severe spinal stenosis, right greater than left lateral recess stenosis with mild-to-moderate bilateral foraminal stenosis.     L4-5: There is a diffuse disc bulge with superimposed shallow broad central disc protrusion with annular fissure.  There is facet joint arthropathy with ligamentum flavum thickening.  There are trace bilateral facet joint effusions.  There is mild-to-moderate spinal stenosis and right greater than left foraminal stenosis.     L5-S1: There is a disc bulge with superimposed broad left paracentral disc protrusion in addition to facet joint arthropathy.  There is no spinal stenosis but the disc protrusion contacts the left S1 root.  There is crowding of the left lateral recess.  There is moderate to marked bilateral foraminal  stenosis.     Soft tissues, other: The prevertebral soft tissues are grossly normal.  The aorta is normal in caliber.  There is no hydronephrosis.    Assessment:   The patient is a 63-year-old man with a history of CAD, CABG, diabetes who presents in referral from Dr. Boss for neck pain and left shoulder pain.   1. Lumbar radiculopathy  Procedure Order to Pain Management      2. DDD (degenerative disc disease), lumbar              Plan:  1. I reviewed the patient's lumbar spine MRI with him and we he discussed that he has varying degrees of multilevel spinal stenosis but also a left-sided disc protrusion at L5/S1 displacing the descending left S1 nerve.  I will schedule him for an L5/S1 interlaminar epidural steroid injection to the left.  2. Follow-up in 4 weeks postprocedure or sooner as needed.

## 2023-02-20 NOTE — PROGRESS NOTES
Subjective:    Patient ID:  Albert David is a 63 y.o. male who presents for follow-up of Follow-up (R knee surgery clearance/ epidural )      HPI  He comes for follow up with no major problems, no chest pain, no shortness of breath.  Class 2.    Scheduled to have epidural injection in couple of weeks and right knee surgery in a month  No cardiac complaints.  Blood pressure normal at home, last hemoglobin A1c was 7.1    Review of Systems   Constitutional: Negative for decreased appetite, malaise/fatigue, weight gain and weight loss.   Cardiovascular:  Negative for chest pain, dyspnea on exertion, leg swelling, palpitations and syncope.   Respiratory:  Negative for cough and shortness of breath.    Gastrointestinal: Negative.    Neurological:  Negative for weakness.   All other systems reviewed and are negative.     Objective:      Physical Exam  Vitals and nursing note reviewed.   Constitutional:       Appearance: Normal appearance. He is well-developed.   HENT:      Head: Normocephalic.   Eyes:      Pupils: Pupils are equal, round, and reactive to light.   Neck:      Thyroid: No thyromegaly.      Vascular: No carotid bruit or JVD.   Cardiovascular:      Rate and Rhythm: Normal rate and regular rhythm.      Chest Wall: PMI is not displaced.      Pulses: Normal pulses and intact distal pulses.      Heart sounds: Normal heart sounds. No murmur heard.    No gallop.   Pulmonary:      Effort: Pulmonary effort is normal.      Breath sounds: Normal breath sounds.   Abdominal:      Palpations: Abdomen is soft. There is no mass.      Tenderness: There is no abdominal tenderness.   Musculoskeletal:         General: Normal range of motion.      Cervical back: Normal range of motion and neck supple.   Skin:     General: Skin is warm.   Neurological:      Mental Status: He is alert and oriented to person, place, and time.      Sensory: No sensory deficit.      Deep Tendon Reflexes: Reflexes are normal and symmetric.      EKG reviewed      Assessment:       1. Coronary artery disease involving native coronary artery of native heart without angina pectoris    2. Diabetic polyneuropathy associated with type 1 diabetes mellitus         Plan:   No contraindication for surgery/anesthesia from cardiac standpoint    Continue all cardiac medications  Regular exercise program  Weight loss  One year follow-up with a stress echocardiogram

## 2023-02-20 NOTE — PROGRESS NOTES
This note was completed with dictation software and grammatical errors may exist.    CC:  Low back and left leg pain    HPI:  The patient is a 63-year-old man with a history of CAD, CABG, diabetes who presents in referral from Dr. Boss for neck pain and left shoulder pain.  He returns in follow-up today to review his lumbar spine MRI results.  He continues to complain of low back pain that radiates to the left buttock, posterior thigh and calf.  His pain is worse with standing and walking and improved with sitting.  He denies weakness or numbness, denies incontinence.    Pain intervention history:  He has a history of an injection for his lumbar spine which help a great deal.  He is status post C7-T1 interlaminar epidural steroid injection on 11/16/2020 with minimal relief.   Status post left C5/6 transforaminal epidural steroid injection on 12/17/2020 with 0% relief. He does report having a lumbar epidural steroid injection in 2017 with relief.     Antineuropathics:  NSAIDs:  Physical therapy:  Has done physical therapy with improvement in his neck and left arm pain  Antidepressants:  Muscle relaxers:  Klonopin  Opioids:  Antiplatelets/Anticoagulants:  ASA 81      ROS:  He reports joint stiffness and anxiety.  Balance of review of systems is negative.    No results found for: LABA1C, HGBA1C    Lab Results   Component Value Date    WBC 5.23 12/10/2019    HGB 14.9 12/10/2019    HCT 43.9 12/10/2019    MCV 86 12/10/2019     12/10/2019             Past Medical History:   Diagnosis Date    Anxiety     Coronary artery disease     Diabetes mellitus, type 2     Hypertension     patient takes because of diabetes-not htn    Renal disorder     kidney stones       Past Surgical History:   Procedure Laterality Date    CARDIAC SURGERY      CATARACT EXTRACTION, BILATERAL      COLONOSCOPY      COLONOSCOPY N/A 3/19/2021    Procedure: COLONOSCOPY;  Surgeon: Nura Ayala MD;  Location: [facility];  Service: Endoscopy;   Laterality: N/A;    CORONARY ARTERY BYPASS GRAFT      ENDOSCOPIC NASAL SEPTOPLASTY      EPIDURAL STEROID INJECTION INTO CERVICAL SPINE N/A 11/16/2020    Procedure: Injection-steroid-epidural-cervical to left;  Surgeon: Felipe Mathews MD;  Location: [facility] OR;  Service: Pain Management;  Laterality: N/A;    EYE SURGERY      vitrectomy, ahmed valve    LITHOTRIPSY      LITHOTRIPSY      ORTHOPEDIC SURGERY      rotaror cuff repair Bilateral     TRANSFORAMINAL EPIDURAL INJECTION OF STEROID Left 12/17/2020    Procedure: Injection,steroid,epidural,transforaminal approach C5,C6;  Surgeon: Felipe Mathews MD;  Location: [facility] OR;  Service: Pain Management;  Laterality: Left;    TRIGGER FINGER RELEASE Right        Social History     Socioeconomic History    Marital status: Single   Tobacco Use    Smoking status: Never    Smokeless tobacco: Never   Substance and Sexual Activity    Alcohol use: Yes     Alcohol/week: 3.0 standard drinks     Types: 3 Cans of beer per week     Comment: only on weekends    Drug use: Not Currently         Medications/Allergies: See med card    Vitals:    02/16/23 0806   BP: 117/63   Pulse: 61   Weight: 88.5 kg (195 lb)   Height: 6' (1.829 m)   PainSc:   4         Physical exam:  Gen: A and O x3, pleasant, well-groomed  Skin: No rashes or obvious lesions  HEENT: PERRLA, no obvious deformities on ears or in canals.Trachea midline.  CVS: Regular rate and rhythm, normal palpable pulses.  Resp: Clear to auscultation bilaterally, no wheezes or rales.  Abdomen: Soft, NT/ND.  Musculoskeletal: Able to heel walk, toe walk. No antalgic gait.     Neuro:  Lower extremities: 5/5 strength bilaterally  Reflexes: Patellar 2+ bilaterally, Achilles 2+ right, 1+ left.  Sensory:  Intact and symmetrical to light touch and pinprick in L2-S1 dermatomes bilaterally.    Lumbar spine:  Lumbar spine: ROM is full with flexion extension and oblique extension with increased left low back pain during left oblique extension  and flexion.  Jeronimo's test causes no increased pain on either side.    Supine straight leg raise causes increased low back and left leg pain.  Internal and external rotation of the hip causes no increased pain on either side.  Myofascial exam: No tenderness to palpation across lumbar paraspinous muscles.    Imagin20 MRI C-spine:   There is degenerative disc desiccation at virtually every cervical level with disc space narrowing and marginal osteophyte formation observed at C5-C6..   The cervical spinal cord is normal in signal intensity without evidence of cord edema, myelomalacia, or cord syrinx.  No epidural fluid collections or masses.   The incidentally observed soft tissues of the neck show no significant abnormalities.   C2-C3: There is left ligamentum flavum thickening.  There is bilateral facet arthropathy, left greater than right.  No disc protrusion or extrusion. No central canal stenosis or neuroforaminal stenosis.   C3-C4: There is bilateral hypertrophic facet arthropathy.  There is minimal left uncovertebral spurring.  There is, at most, mild bilateral neuroforaminal stenosis.  There is effacement of the anterior CSF sleeve.  No central canal stenosis.   C4-C5: There is bilateral uncovertebral spurring, left greater than right, and there is prominent left facet arthropathy.  There is severe left neuroforaminal stenosis.  Please correlate clinically for symptoms referable to the left C5 nerve.  No right neuroforaminal stenosis.  There is effacement of the anterior CSF sleeve.  No overall central canal stenosis.   C5-C6: There is prominent bilateral uncovertebral spurring and bilateral facet arthropathy, left greater than right.  There is a broad central disc protrusion superimposed upon a posterior disc osteophyte complex.  There is moderate central canal stenosis and flattening of the ventral cord.  There is ligamentum flavum thickening.  There is moderate right and severe left neuroforaminal  stenosis.  Please correlate clinically for symptoms referable to the C6 nerves, left greater than right.   C6-C7: There is ligamentum flavum thickening and bilateral uncovertebral spurring, right greater than left.  No left neuroforaminal stenosis.  There is, at most, mild right neuroforaminal stenosis.  There is mild overall central canal stenosis.  C7-T1: There is bilateral facet arthropathy and ligamentum flavum thickening.  There is right uncovertebral spurring.  No central canal or neuroforaminal stenosis.    7/11/20 MRI Left shoulder:  A small fragment of shrapnel in the superficial aspect of the lateral deltoid muscle causes metallic artifact.  There is degenerative arthrosis of the glenohumeral joint with joint space narrowing, articular cartilage thinning, and degenerative fraying of the glenoid labrum.  A very small joint effusion is also present.  There is degenerative tendinosis of the supraspinatus tendon without evidence of significant tear.  Other rotator cuff tendons appear normal.  The acromioclavicular joint is relatively well maintained.  Visualized ligaments appear intact.  Bone marrow signal appears normal.    02/02/2023 MRI lumbar spine   Vertebral column: The lumbar vertebral bodies maintain normal height.  There is no fracture.  Baseline marrow signal is normal.  There is mild disc space narrowing at the L1-2 level where there is also mild osteophyte formation.  There is no significant disc space narrowing.     Spinal canal, conus, epidural space: The spinal canal is small on a developmental basis.  The conus terminates at the level of L1 and is normal in contour and signal intensity.  There is no abnormal epidural mass or fluid collection.     Findings by level:     T11-12: There is bilateral facet joint arthropathy.  There is also left-sided ligamentum flavum thickening with ossification.  This narrows the left posterolateral dural sac and contributes to mild spinal stenosis.  There is no  cord compression.  There is also moderate left foraminal stenosis.     T12-L1: There is mild facet joint arthropathy.  There is no spinal canal or significant foraminal stenosis.     L1-2: There is mild disc space narrowing.  There is a diffuse disc bulge with mild osteophytic ridging.  There is left greater than right facet joint arthropathy with ossification of the ligamentum flavum worse on the left as well.  All of these factors contribute to severe spinal stenosis, crowding of the lateral recess, left greater than right as well as mild bilateral foraminal stenosis.     L2-3: There is a mild disc bulge.  There is facet joint arthropathy.  Again, there is left greater than right ligamentum flavum thickening with ossification suspected.  There is mild-to-moderate spinal stenosis, moderate crowding of the left lateral recess with mild bilateral foraminal stenosis.     L3-4: There is a diffuse disc bulge with osteophytic ridging and superimposed shallow broad right paracentral and foraminal disc protrusion.  There is facet joint arthropathy with ligamentum flavum thickening as well as mildly prominent dorsal epidural fat.  All of these factors contribute to moderate to severe spinal stenosis, right greater than left lateral recess stenosis with mild-to-moderate bilateral foraminal stenosis.     L4-5: There is a diffuse disc bulge with superimposed shallow broad central disc protrusion with annular fissure.  There is facet joint arthropathy with ligamentum flavum thickening.  There are trace bilateral facet joint effusions.  There is mild-to-moderate spinal stenosis and right greater than left foraminal stenosis.     L5-S1: There is a disc bulge with superimposed broad left paracentral disc protrusion in addition to facet joint arthropathy.  There is no spinal stenosis but the disc protrusion contacts the left S1 root.  There is crowding of the left lateral recess.  There is moderate to marked bilateral foraminal  stenosis.     Soft tissues, other: The prevertebral soft tissues are grossly normal.  The aorta is normal in caliber.  There is no hydronephrosis.    Assessment:   The patient is a 63-year-old man with a history of CAD, CABG, diabetes who presents in referral from Dr. Boss for neck pain and left shoulder pain.   1. Lumbar radiculopathy  Procedure Order to Pain Management      2. DDD (degenerative disc disease), lumbar              Plan:  1. I reviewed the patient's lumbar spine MRI with him and we he discussed that he has varying degrees of multilevel spinal stenosis but also a left-sided disc protrusion at L5/S1 displacing the descending left S1 nerve.  I will schedule him for an L5/S1 interlaminar epidural steroid injection to the left.  2. Follow-up in 4 weeks postprocedure or sooner as needed.

## 2023-03-09 ENCOUNTER — HOSPITAL ENCOUNTER (OUTPATIENT)
Dept: RADIOLOGY | Facility: HOSPITAL | Age: 63
Discharge: HOME | End: 2023-03-09
Attending: ANESTHESIOLOGY | Admitting: ANESTHESIOLOGY
Payer: MEDICARE

## 2023-03-09 ENCOUNTER — HOSPITAL ENCOUNTER (OUTPATIENT)
Facility: HOSPITAL | Age: 63
Discharge: HOME | End: 2023-03-09
Attending: ANESTHESIOLOGY | Admitting: ANESTHESIOLOGY
Payer: MEDICARE

## 2023-03-09 VITALS
WEIGHT: 196 LBS | SYSTOLIC BLOOD PRESSURE: 116 MMHG | OXYGEN SATURATION: 97 % | DIASTOLIC BLOOD PRESSURE: 73 MMHG | BODY MASS INDEX: 26.55 KG/M2 | HEART RATE: 58 BPM | RESPIRATION RATE: 18 BRPM | TEMPERATURE: 98 F | HEIGHT: 72 IN

## 2023-03-09 DIAGNOSIS — M54.50 LOWER BACK PAIN: ICD-10-CM

## 2023-03-09 DIAGNOSIS — M54.16 LUMBAR RADICULOPATHY: ICD-10-CM

## 2023-03-09 LAB — GLUCOSE SERPL-MCNC: 100 MG/DL (ref 70–110)

## 2023-03-09 PROCEDURE — 62323 NJX INTERLAMINAR LMBR/SAC: CPT | Mod: ,,, | Performed by: ANESTHESIOLOGY

## 2023-03-09 PROCEDURE — 25500020 PHARM REV CODE 255: Mod: PO | Performed by: ANESTHESIOLOGY

## 2023-03-09 PROCEDURE — 76000 FLUOROSCOPY <1 HR PHYS/QHP: CPT | Mod: TC,PO

## 2023-03-09 PROCEDURE — 62323 PR INJ LUMBAR/SACRAL, W/IMAGING GUIDANCE: ICD-10-PCS | Mod: ,,, | Performed by: ANESTHESIOLOGY

## 2023-03-09 PROCEDURE — 25000003 PHARM REV CODE 250: Mod: PO | Performed by: ANESTHESIOLOGY

## 2023-03-09 PROCEDURE — 62323 NJX INTERLAMINAR LMBR/SAC: CPT | Mod: PO | Performed by: ANESTHESIOLOGY

## 2023-03-09 PROCEDURE — 63600175 PHARM REV CODE 636 W HCPCS: Mod: PO | Performed by: ANESTHESIOLOGY

## 2023-03-09 RX ORDER — LIDOCAINE HYDROCHLORIDE 10 MG/ML
INJECTION, SOLUTION EPIDURAL; INFILTRATION; INTRACAUDAL; PERINEURAL
Status: DISCONTINUED | OUTPATIENT
Start: 2023-03-09 | End: 2023-03-09 | Stop reason: HOSPADM

## 2023-03-09 RX ORDER — ALPRAZOLAM 0.5 MG/1
1 TABLET, ORALLY DISINTEGRATING ORAL ONCE AS NEEDED
Status: COMPLETED | OUTPATIENT
Start: 2023-03-09 | End: 2023-03-09

## 2023-03-09 RX ORDER — METHYLPREDNISOLONE ACETATE 80 MG/ML
INJECTION, SUSPENSION INTRA-ARTICULAR; INTRALESIONAL; INTRAMUSCULAR; SOFT TISSUE
Status: DISCONTINUED | OUTPATIENT
Start: 2023-03-09 | End: 2023-03-09 | Stop reason: HOSPADM

## 2023-03-09 RX ADMIN — ALPRAZOLAM 1 MG: 0.5 TABLET, ORALLY DISINTEGRATING ORAL at 12:03

## 2023-03-09 NOTE — OP NOTE

## 2023-03-09 NOTE — DISCHARGE SUMMARY
[facility] - Surgery  Discharge Note  Short Stay    Procedure(s) (LRB):  Injection-steroid-epidural-lumbar (N/A)      OUTCOME: Patient tolerated treatment/procedure well without complication and is now ready for discharge.    DISPOSITION: Home or Self Care    FINAL DIAGNOSIS:  Lumbar radiculopathy    FOLLOWUP: In clinic    DISCHARGE INSTRUCTIONS:    Discharge Procedure Orders   Diet Adult Regular     No dressing needed     Notify your health care provider if you experience any of the following:  temperature >100.4     Activity as tolerated

## 2024-02-01 ENCOUNTER — TELEPHONE (OUTPATIENT)
Dept: PAIN MEDICINE | Facility: CLINIC | Age: 64
End: 2024-02-01
Payer: MEDICARE

## 2024-02-01 NOTE — TELEPHONE ENCOUNTER
----- Message from Phylicia Snowden sent at 2/1/2024  1:40 PM CST -----  Regarding: Call back  Type:  Sooner Apoointment Request    Caller is requesting a sooner appointment.  Caller declined first available appointment listed below.  Caller will not accept being placed on the waitlist and is requesting a message be sent to doctor.  Name of Caller:Pt    When is the first available appointment?3/12/24    Symptoms:Lower back pain    Would the patient rather a call back or a response via [employer]? Call back    Best Call Back Number:[phone]    Additional Information: Pt would like a sooner appt. Thank you

## 2024-03-12 ENCOUNTER — TELEPHONE (OUTPATIENT)
Dept: PAIN MEDICINE | Facility: CLINIC | Age: 64
End: 2024-03-12
Payer: MEDICARE

## 2024-03-12 ENCOUNTER — OFFICE VISIT (OUTPATIENT)
Dept: PAIN MEDICINE | Facility: CLINIC | Age: 64
End: 2024-03-12
Payer: MEDICARE

## 2024-03-12 VITALS
WEIGHT: 194.69 LBS | BODY MASS INDEX: 26.37 KG/M2 | HEART RATE: 69 BPM | DIASTOLIC BLOOD PRESSURE: 64 MMHG | SYSTOLIC BLOOD PRESSURE: 111 MMHG | HEIGHT: 72 IN

## 2024-03-12 DIAGNOSIS — M51.36 DDD (DEGENERATIVE DISC DISEASE), LUMBAR: ICD-10-CM

## 2024-03-12 DIAGNOSIS — M47.816 LUMBAR SPONDYLOSIS: Primary | ICD-10-CM

## 2024-03-12 PROCEDURE — 99999 PR PBB SHADOW E&M-EST. PATIENT-LVL IV: CPT | Mod: PBBFAC,,, | Performed by: PHYSICIAN ASSISTANT

## 2024-03-12 PROCEDURE — 99214 OFFICE O/P EST MOD 30 MIN: CPT | Mod: PBBFAC,PO | Performed by: PHYSICIAN ASSISTANT

## 2024-03-12 PROCEDURE — 99214 OFFICE O/P EST MOD 30 MIN: CPT | Mod: S$PBB,,, | Performed by: PHYSICIAN ASSISTANT

## 2024-03-12 NOTE — TELEPHONE ENCOUNTER
Physician - Dr Mathews    Type of Procedure/Injection - Lumbar Medial Branch Block  L4/5 and L5/S1           Laterality - Bilateral      Anxiolysis- RNIV      Need to hold medication - No      N/A      Clearance needed - No      Follow up - phone call next day

## 2024-03-13 ENCOUNTER — TELEPHONE (OUTPATIENT)
Dept: PAIN MEDICINE | Facility: CLINIC | Age: 64
End: 2024-03-13
Payer: MEDICARE

## 2024-03-14 NOTE — TELEPHONE ENCOUNTER
----- Message from Radha Reed sent at 3/14/2024 12:20 PM CDT -----  Type: Needs Medical Advice  Who Called:  pt  Symptoms (please be specific):  pt said he need to speak to the NP--said he need to know when he can come and  the alex of his x-ray of his back another dr need to see them as well--please call and advise  Best Call Back Number: [phone] ([place])     Additional Information: thank you

## 2024-03-18 NOTE — PROGRESS NOTES
"This note was completed with dictation software and grammatical errors may exist.    CC:  Low back pain    HPI:  The patient is a 64-year-old man with a history of CAD, CABG, diabetes who presents in referral from Dr. Boss for neck pain and left shoulder pain.  He is status post L5/S1 interlaminar epidural steroid injection on 03/09/2023 with 100% relief.  It has been about 1 year since his last visit.  He reports a different kind of pain over the past 4-5 months.  This is located across the low back without any radiation into his legs.  Pain as aching, worse in the mornings, with walking and with arching his low back.  He denies weakness or numbness, denies bladder or bowel incontinence.  He had MRIs done at [facility] which we will send into our radiology department to scan into our system.    Pain intervention history:  He has a history of an injection for his lumbar spine which help a great deal.  He is status post C7-T1 interlaminar epidural steroid injection on 11/16/2020 with minimal relief.   Status post left C5/6 transforaminal epidural steroid injection on 12/17/2020 with 0% relief. He does report having a lumbar epidural steroid injection in 2017 with relief.     Antineuropathics:  NSAIDs:  Physical therapy:  Has done physical therapy in the past, continues home exercise program  Antidepressants:  Muscle relaxers:  Klonopin  Opioids:  Antiplatelets/Anticoagulants:  ASA 81    ROS:  He reports joint stiffness and anxiety.  Balance of review of systems is negative.    No results found for: "LABA1C", "HGBA1C"    Lab Results   Component Value Date    WBC 5.23 12/10/2019    HGB 14.9 12/10/2019    HCT 43.9 12/10/2019    MCV 86 12/10/2019     12/10/2019         Past Medical History:   Diagnosis Date    Anxiety     Coronary artery disease     Diabetes mellitus, type 2     Hypertension     patient takes because of diabetes-not htn    Renal disorder     kidney stones       Past Surgical History:   Procedure " Laterality Date    CARDIAC SURGERY      CATARACT EXTRACTION, BILATERAL      COLONOSCOPY      COLONOSCOPY N/A 3/19/2021    Procedure: COLONOSCOPY;  Surgeon: Nura Ayala MD;  Location: [facility];  Service: Endoscopy;  Laterality: N/A;    CORONARY ARTERY BYPASS GRAFT      ENDOSCOPIC NASAL SEPTOPLASTY      EPIDURAL STEROID INJECTION INTO CERVICAL SPINE N/A 11/16/2020    Procedure: Injection-steroid-epidural-cervical to left;  Surgeon: Felipe Mathews MD;  Location: [facility] OR;  Service: Pain Management;  Laterality: N/A;    EPIDURAL STEROID INJECTION INTO LUMBAR SPINE N/A 3/9/2023    Procedure: Injection-steroid-epidural-lumbar;  Surgeon: Felipe Mathews MD;  Location: [facility] OR;  Service: Pain Management;  Laterality: N/A;  l5/s1 spread to left    EYE SURGERY      vitrectomy, ahmed valve    LITHOTRIPSY      LITHOTRIPSY      ORTHOPEDIC SURGERY      rotaror cuff repair Bilateral     TRANSFORAMINAL EPIDURAL INJECTION OF STEROID Left 12/17/2020    Procedure: Injection,steroid,epidural,transforaminal approach C5,C6;  Surgeon: Felipe Mathews MD;  Location: [facility] OR;  Service: Pain Management;  Laterality: Left;    TRIGGER FINGER RELEASE Right        Social History     Socioeconomic History    Marital status: Single   Tobacco Use    Smoking status: Never    Smokeless tobacco: Never   Substance and Sexual Activity    Alcohol use: Yes     Alcohol/week: 3.0 standard drinks of alcohol     Types: 3 Cans of beer per week     Comment: only on weekends    Drug use: Not Currently         Medications/Allergies: See med card    Vitals:    03/12/24 1505   BP: 111/64   Pulse: 69   Weight: 88.3 kg (194 lb 10.7 oz)   Height: 6' (1.829 m)   PainSc:   3   PainLoc: Back         3/12/2024     3:06 PM 8/10/2021     3:43 PM 1/8/2021     8:27 AM   Last 3 PDI Scores   Pain Disability Index (PDI) 21 0 0         Physical exam:  Gen: A and O x3, pleasant, well-groomed  Skin: No rashes or obvious lesions  HEENT: PERRLA, no obvious  deformities on ears or in canals.Trachea midline.  CVS: Regular rate and rhythm, normal palpable pulses.  Resp: Clear to auscultation bilaterally, no wheezes or rales.  Abdomen: Soft, NT/ND.  Musculoskeletal: Able to heel walk, toe walk. No antalgic gait.     Neuro:  Lower extremities: 5/5 strength bilaterally  Reflexes: Patellar 2+ bilaterally, Achilles 2+ right, 1+ left.  Sensory:  Intact and symmetrical to light touch and pinprick in L2-S1 dermatomes bilaterally.    Lumbar spine:  Lumbar spine: ROM is full with flexion extension and oblique extension with increased low back pain during extension and oblique extension to the corresponding side.  Jeronimo's test causes no increased pain on either side.    Supine straight leg raise is negative bilaterally.  Internal and external rotation of the hip causes no increased pain on either side.  Myofascial exam: No tenderness to palpation across lumbar paraspinous muscles.    Imagin20 MRI C-spine:   There is degenerative disc desiccation at virtually every cervical level with disc space narrowing and marginal osteophyte formation observed at C5-C6..   The cervical spinal cord is normal in signal intensity without evidence of cord edema, myelomalacia, or cord syrinx.  No epidural fluid collections or masses.   The incidentally observed soft tissues of the neck show no significant abnormalities.   C2-C3: There is left ligamentum flavum thickening.  There is bilateral facet arthropathy, left greater than right.  No disc protrusion or extrusion. No central canal stenosis or neuroforaminal stenosis.   C3-C4: There is bilateral hypertrophic facet arthropathy.  There is minimal left uncovertebral spurring.  There is, at most, mild bilateral neuroforaminal stenosis.  There is effacement of the anterior CSF sleeve.  No central canal stenosis.   C4-C5: There is bilateral uncovertebral spurring, left greater than right, and there is prominent left facet arthropathy.  There  is severe left neuroforaminal stenosis.  Please correlate clinically for symptoms referable to the left C5 nerve.  No right neuroforaminal stenosis.  There is effacement of the anterior CSF sleeve.  No overall central canal stenosis.   C5-C6: There is prominent bilateral uncovertebral spurring and bilateral facet arthropathy, left greater than right.  There is a broad central disc protrusion superimposed upon a posterior disc osteophyte complex.  There is moderate central canal stenosis and flattening of the ventral cord.  There is ligamentum flavum thickening.  There is moderate right and severe left neuroforaminal stenosis.  Please correlate clinically for symptoms referable to the C6 nerves, left greater than right.   C6-C7: There is ligamentum flavum thickening and bilateral uncovertebral spurring, right greater than left.  No left neuroforaminal stenosis.  There is, at most, mild right neuroforaminal stenosis.  There is mild overall central canal stenosis.  C7-T1: There is bilateral facet arthropathy and ligamentum flavum thickening.  There is right uncovertebral spurring.  No central canal or neuroforaminal stenosis.    7/11/20 MRI Left shoulder:  A small fragment of shrapnel in the superficial aspect of the lateral deltoid muscle causes metallic artifact.  There is degenerative arthrosis of the glenohumeral joint with joint space narrowing, articular cartilage thinning, and degenerative fraying of the glenoid labrum.  A very small joint effusion is also present.  There is degenerative tendinosis of the supraspinatus tendon without evidence of significant tear.  Other rotator cuff tendons appear normal.  The acromioclavicular joint is relatively well maintained.  Visualized ligaments appear intact.  Bone marrow signal appears normal.    02/02/2023 MRI lumbar spine   Vertebral column: The lumbar vertebral bodies maintain normal height.  There is no fracture.  Baseline marrow signal is normal.  There is mild  disc space narrowing at the L1-2 level where there is also mild osteophyte formation.  There is no significant disc space narrowing.     Spinal canal, conus, epidural space: The spinal canal is small on a developmental basis.  The conus terminates at the level of L1 and is normal in contour and signal intensity.  There is no abnormal epidural mass or fluid collection.     Findings by level:     T11-12: There is bilateral facet joint arthropathy.  There is also left-sided ligamentum flavum thickening with ossification.  This narrows the left posterolateral dural sac and contributes to mild spinal stenosis.  There is no cord compression.  There is also moderate left foraminal stenosis.     T12-L1: There is mild facet joint arthropathy.  There is no spinal canal or significant foraminal stenosis.     L1-2: There is mild disc space narrowing.  There is a diffuse disc bulge with mild osteophytic ridging.  There is left greater than right facet joint arthropathy with ossification of the ligamentum flavum worse on the left as well.  All of these factors contribute to severe spinal stenosis, crowding of the lateral recess, left greater than right as well as mild bilateral foraminal stenosis.     L2-3: There is a mild disc bulge.  There is facet joint arthropathy.  Again, there is left greater than right ligamentum flavum thickening with ossification suspected.  There is mild-to-moderate spinal stenosis, moderate crowding of the left lateral recess with mild bilateral foraminal stenosis.     L3-4: There is a diffuse disc bulge with osteophytic ridging and superimposed shallow broad right paracentral and foraminal disc protrusion.  There is facet joint arthropathy with ligamentum flavum thickening as well as mildly prominent dorsal epidural fat.  All of these factors contribute to moderate to severe spinal stenosis, right greater than left lateral recess stenosis with mild-to-moderate bilateral foraminal stenosis.     L4-5:  There is a diffuse disc bulge with superimposed shallow broad central disc protrusion with annular fissure.  There is facet joint arthropathy with ligamentum flavum thickening.  There are trace bilateral facet joint effusions.  There is mild-to-moderate spinal stenosis and right greater than left foraminal stenosis.     L5-S1: There is a disc bulge with superimposed broad left paracentral disc protrusion in addition to facet joint arthropathy.  There is no spinal stenosis but the disc protrusion contacts the left S1 root.  There is crowding of the left lateral recess.  There is moderate to marked bilateral foraminal stenosis.     Soft tissues, other: The prevertebral soft tissues are grossly normal.  The aorta is normal in caliber.  There is no hydronephrosis.    12/26/2023 MRI lumbar spine [facility]  Straightening of the lumbar lordosis  Disc desiccation at L1-2, L4-5 and L5-S1  L1-2 3 millimeter broad-based disc herniation, prominent bilateral facet joint arthropathy and ligamentum flavum hypertrophy, spinal stenosis of 7.8 millimeters, left lateral recess stenosis, mild bilateral foraminal stenosis, contact along the descending left L2 nerve root   L2-3 2 millimeter annular bulge, bilateral facet arthropathy, mild foraminal narrowing left greater than right, ligamentum flavum hypertrophy  L3-4 3 millimeter disc herniation extending into the foramen, bilateral facet joint arthropathy and hypertrophy, spinal stenosis of 8.5 millimeters, synovial cysts from the left facet joint measuring 5.5 millimeters, moderate bilateral foraminal stenosis, contact along the undersurface of the exiting L3 nerve roots bilaterally  L4-5 3 millimeter disc herniation with annular fissure, bilateral facet joint arthropathy, mild bilateral foraminal stenosis  L5-S1 left subarticular focal disc herniation, extrusion extending 10 milliliter his posterior to the vertebral column, impingement on the descending left S1 nerve root in the left  lateral recess, annular fissure, moderate foraminal restriction secondary to disc herniation and facet arthropathy, contact of the exiting L5 nerve roots bilaterally      Assessment:   The patient is a 64-year-old man with a history of CAD, CABG, diabetes who presents in referral from Dr. Boss for neck pain and left shoulder pain.   1. Lumbar spondylosis        2. DDD (degenerative disc disease), lumbar              Plan:  1. We discussed that he is not experiencing the radicular pain that he had in the past.  His current pain is likely axial facet mediated and I will schedule him for bilateral L4/5 and L5/S1 diagnostic medial branch nerve blocks.  If successful we will repeat the blocks prior to proceeding with radiofrequency ablation.  If his radicular pain returns we can consider an epidural steroid injection.    2. Follow-up in 4 weeks postprocedure or sooner as needed.

## 2024-03-20 ENCOUNTER — PATIENT MESSAGE (OUTPATIENT)
Dept: PAIN MEDICINE | Facility: CLINIC | Age: 64
End: 2024-03-20
Payer: MEDICARE

## 2024-03-20 ENCOUNTER — TELEPHONE (OUTPATIENT)
Dept: PAIN MEDICINE | Facility: CLINIC | Age: 64
End: 2024-03-20
Payer: MEDICARE

## 2024-03-22 ENCOUNTER — TELEPHONE (OUTPATIENT)
Dept: PAIN MEDICINE | Facility: CLINIC | Age: 64
End: 2024-03-22
Payer: MEDICARE

## 2024-03-22 DIAGNOSIS — M47.816 LUMBAR SPONDYLOSIS: Primary | ICD-10-CM

## 2024-03-22 RX ORDER — SODIUM CHLORIDE, SODIUM LACTATE, POTASSIUM CHLORIDE, CALCIUM CHLORIDE 600; 310; 30; 20 MG/100ML; MG/100ML; MG/100ML; MG/100ML
INJECTION, SOLUTION INTRAVENOUS CONTINUOUS
Status: CANCELLED | OUTPATIENT
Start: 2024-03-22

## 2024-03-22 NOTE — TELEPHONE ENCOUNTER
Spoke with pt scheduled first diagnostic lumbar MBB bilat L4/5 and L5/S1 with Dr. Mathews on 4/22/24 with RN IV sedation. Reviewed pre-op instructions and sent pain diary questionnaire on date of procedure.

## 2024-04-15 ENCOUNTER — TELEPHONE (OUTPATIENT)
Dept: SURGERY | Facility: HOSPITAL | Age: 64
End: 2024-04-15
Payer: MEDICARE

## 2024-04-15 NOTE — TELEPHONE ENCOUNTER
"Pt scheduled on Monday, 4/22 for Lumbar ZABRINA L4/5 & L5/S1 - pt states "I thought I cancelled that already - I have an appointment with Dr. Jones. I am tired of dealing with [facility] & their online mess".   "

## (undated) DEVICE — APPLICATOR CHLORAPREP CLR 10.5

## (undated) DEVICE — GLOVE SURGICAL LATEX SZ 7

## (undated) DEVICE — SYR GLASS 5CC LUER LOK

## (undated) DEVICE — NDL SPINAL SPINOCAN 22GX3.5

## (undated) DEVICE — TRAY NERVE BLOCK

## (undated) DEVICE — MARKER SKIN STND TIP BLUE BARR

## (undated) DEVICE — NDL TUOHY EPIDURAL 20G X 3.5

## (undated) DEVICE — SEE MEDLINE ITEM 152622